# Patient Record
Sex: FEMALE | Race: WHITE | Employment: OTHER | URBAN - METROPOLITAN AREA
[De-identification: names, ages, dates, MRNs, and addresses within clinical notes are randomized per-mention and may not be internally consistent; named-entity substitution may affect disease eponyms.]

---

## 2017-02-09 ENCOUNTER — OFFICE VISIT (OUTPATIENT)
Dept: FAMILY MEDICINE CLINIC | Age: 77
End: 2017-02-09

## 2017-02-09 VITALS
BODY MASS INDEX: 19.99 KG/M2 | HEART RATE: 62 BPM | DIASTOLIC BLOOD PRESSURE: 60 MMHG | SYSTOLIC BLOOD PRESSURE: 138 MMHG | RESPIRATION RATE: 16 BRPM | OXYGEN SATURATION: 99 % | WEIGHT: 120 LBS | HEIGHT: 65 IN

## 2017-02-09 DIAGNOSIS — I48.0 PAROXYSMAL A-FIB (HCC): ICD-10-CM

## 2017-02-09 DIAGNOSIS — E03.9 HYPOTHYROIDISM, UNSPECIFIED TYPE: ICD-10-CM

## 2017-02-09 DIAGNOSIS — I10 ESSENTIAL HYPERTENSION: ICD-10-CM

## 2017-02-09 DIAGNOSIS — I10 ESSENTIAL HYPERTENSION: Primary | ICD-10-CM

## 2017-02-09 LAB
A/G RATIO: 1.9 (ref 1.1–2.2)
ALBUMIN SERPL-MCNC: 4.2 G/DL (ref 3.4–5)
ALP BLD-CCNC: 62 U/L (ref 40–129)
ALT SERPL-CCNC: 15 U/L (ref 10–40)
ANION GAP SERPL CALCULATED.3IONS-SCNC: 12 MMOL/L (ref 3–16)
AST SERPL-CCNC: 18 U/L (ref 15–37)
BASOPHILS ABSOLUTE: 0.1 K/UL (ref 0–0.2)
BASOPHILS RELATIVE PERCENT: 1.5 %
BILIRUB SERPL-MCNC: 0.6 MG/DL (ref 0–1)
BUN BLDV-MCNC: 11 MG/DL (ref 7–20)
CALCIUM SERPL-MCNC: 9.2 MG/DL (ref 8.3–10.6)
CHLORIDE BLD-SCNC: 101 MMOL/L (ref 99–110)
CHOLESTEROL, TOTAL: 215 MG/DL (ref 0–199)
CO2: 26 MMOL/L (ref 21–32)
CREAT SERPL-MCNC: <0.5 MG/DL (ref 0.6–1.2)
EOSINOPHILS ABSOLUTE: 0.2 K/UL (ref 0–0.6)
EOSINOPHILS RELATIVE PERCENT: 5.1 %
GFR AFRICAN AMERICAN: >60
GFR NON-AFRICAN AMERICAN: >60
GLOBULIN: 2.2 G/DL
GLUCOSE BLD-MCNC: 88 MG/DL (ref 70–99)
HCT VFR BLD CALC: 39 % (ref 36–48)
HDLC SERPL-MCNC: 92 MG/DL (ref 40–60)
HEMOGLOBIN: 13.2 G/DL (ref 12–16)
LDL CHOLESTEROL CALCULATED: 105 MG/DL
LYMPHOCYTES ABSOLUTE: 1.4 K/UL (ref 1–5.1)
LYMPHOCYTES RELATIVE PERCENT: 32.9 %
MCH RBC QN AUTO: 33.4 PG (ref 26–34)
MCHC RBC AUTO-ENTMCNC: 33.7 G/DL (ref 31–36)
MCV RBC AUTO: 99.2 FL (ref 80–100)
MONOCYTES ABSOLUTE: 0.8 K/UL (ref 0–1.3)
MONOCYTES RELATIVE PERCENT: 18.5 %
NEUTROPHILS ABSOLUTE: 1.8 K/UL (ref 1.7–7.7)
NEUTROPHILS RELATIVE PERCENT: 42 %
PDW BLD-RTO: 13 % (ref 12.4–15.4)
PLATELET # BLD: 198 K/UL (ref 135–450)
PMV BLD AUTO: 9.2 FL (ref 5–10.5)
POTASSIUM SERPL-SCNC: 4.3 MMOL/L (ref 3.5–5.1)
RBC # BLD: 3.93 M/UL (ref 4–5.2)
SODIUM BLD-SCNC: 139 MMOL/L (ref 136–145)
T4 FREE: 1.5 NG/DL (ref 0.9–1.8)
TOTAL PROTEIN: 6.4 G/DL (ref 6.4–8.2)
TRIGL SERPL-MCNC: 89 MG/DL (ref 0–150)
TSH REFLEX: 4.79 UIU/ML (ref 0.27–4.2)
VLDLC SERPL CALC-MCNC: 18 MG/DL
WBC # BLD: 4.3 K/UL (ref 4–11)

## 2017-02-09 PROCEDURE — 4040F PNEUMOC VAC/ADMIN/RCVD: CPT | Performed by: FAMILY MEDICINE

## 2017-02-09 PROCEDURE — 1123F ACP DISCUSS/DSCN MKR DOCD: CPT | Performed by: FAMILY MEDICINE

## 2017-02-09 PROCEDURE — 1036F TOBACCO NON-USER: CPT | Performed by: FAMILY MEDICINE

## 2017-02-09 PROCEDURE — G8484 FLU IMMUNIZE NO ADMIN: HCPCS | Performed by: FAMILY MEDICINE

## 2017-02-09 PROCEDURE — 99214 OFFICE O/P EST MOD 30 MIN: CPT | Performed by: FAMILY MEDICINE

## 2017-02-09 PROCEDURE — G8427 DOCREV CUR MEDS BY ELIG CLIN: HCPCS | Performed by: FAMILY MEDICINE

## 2017-02-09 PROCEDURE — G8419 CALC BMI OUT NRM PARAM NOF/U: HCPCS | Performed by: FAMILY MEDICINE

## 2017-02-09 PROCEDURE — G8399 PT W/DXA RESULTS DOCUMENT: HCPCS | Performed by: FAMILY MEDICINE

## 2017-02-09 PROCEDURE — 1090F PRES/ABSN URINE INCON ASSESS: CPT | Performed by: FAMILY MEDICINE

## 2017-02-09 RX ORDER — LEVOTHYROXINE SODIUM 0.07 MG/1
75 TABLET ORAL DAILY
Qty: 90 TABLET | Refills: 5 | Status: SHIPPED | OUTPATIENT
Start: 2017-02-09

## 2017-02-09 ASSESSMENT — ENCOUNTER SYMPTOMS
SHORTNESS OF BREATH: 0
ORTHOPNEA: 0
BLURRED VISION: 0

## 2017-04-24 RX ORDER — ESTRADIOL 10 UG/1
10 INSERT VAGINAL
Qty: 24 TABLET | Refills: 3 | Status: SHIPPED | OUTPATIENT
Start: 2017-04-24 | End: 2017-06-05 | Stop reason: SDUPTHER

## 2017-06-05 ENCOUNTER — OFFICE VISIT (OUTPATIENT)
Dept: OBGYN CLINIC | Age: 77
End: 2017-06-05

## 2017-06-05 VITALS
HEIGHT: 64 IN | SYSTOLIC BLOOD PRESSURE: 106 MMHG | BODY MASS INDEX: 20.14 KG/M2 | WEIGHT: 118 LBS | DIASTOLIC BLOOD PRESSURE: 62 MMHG | TEMPERATURE: 98.8 F | HEART RATE: 62 BPM

## 2017-06-05 DIAGNOSIS — N36.2 URETHRAL CARUNCLE: ICD-10-CM

## 2017-06-05 DIAGNOSIS — Z53.8 PAP SMEAR OF CERVIX NOT NEEDED: ICD-10-CM

## 2017-06-05 DIAGNOSIS — N81.11 MIDLINE CYSTOCELE: ICD-10-CM

## 2017-06-05 DIAGNOSIS — Z01.419 WOMEN'S ANNUAL ROUTINE GYNECOLOGICAL EXAMINATION: Primary | ICD-10-CM

## 2017-06-05 DIAGNOSIS — N95.2 VAGINAL ATROPHY: ICD-10-CM

## 2017-06-05 PROCEDURE — 1036F TOBACCO NON-USER: CPT | Performed by: OBSTETRICS & GYNECOLOGY

## 2017-06-05 PROCEDURE — G8427 DOCREV CUR MEDS BY ELIG CLIN: HCPCS | Performed by: OBSTETRICS & GYNECOLOGY

## 2017-06-05 PROCEDURE — G8420 CALC BMI NORM PARAMETERS: HCPCS | Performed by: OBSTETRICS & GYNECOLOGY

## 2017-06-05 PROCEDURE — G0101 CA SCREEN;PELVIC/BREAST EXAM: HCPCS | Performed by: OBSTETRICS & GYNECOLOGY

## 2017-06-05 RX ORDER — ESTRADIOL 10 UG/1
10 INSERT VAGINAL
Qty: 24 TABLET | Refills: 3 | Status: SHIPPED | OUTPATIENT
Start: 2017-06-05 | End: 2018-04-02 | Stop reason: ALTCHOICE

## 2017-06-11 PROBLEM — Z53.8 PAP SMEAR OF CERVIX NOT NEEDED: Status: ACTIVE | Noted: 2017-06-11

## 2017-06-11 ASSESSMENT — ENCOUNTER SYMPTOMS
NAUSEA: 0
DIARRHEA: 0
ABDOMINAL PAIN: 0
SHORTNESS OF BREATH: 0
CONSTIPATION: 0
VOMITING: 0
ABDOMINAL DISTENTION: 0

## 2017-09-01 ENCOUNTER — OFFICE VISIT (OUTPATIENT)
Dept: CARDIOLOGY CLINIC | Age: 77
End: 2017-09-01

## 2017-09-01 VITALS
HEART RATE: 59 BPM | HEIGHT: 64 IN | OXYGEN SATURATION: 98 % | SYSTOLIC BLOOD PRESSURE: 139 MMHG | BODY MASS INDEX: 20.4 KG/M2 | DIASTOLIC BLOOD PRESSURE: 73 MMHG | WEIGHT: 119.5 LBS

## 2017-09-01 DIAGNOSIS — I48.91 ATRIAL FIBRILLATION WITH RVR (HCC): Primary | ICD-10-CM

## 2017-09-01 DIAGNOSIS — I48.91 ATRIAL FIBRILLATION, UNSPECIFIED TYPE (HCC): ICD-10-CM

## 2017-09-01 PROCEDURE — 4040F PNEUMOC VAC/ADMIN/RCVD: CPT | Performed by: INTERNAL MEDICINE

## 2017-09-01 PROCEDURE — G8420 CALC BMI NORM PARAMETERS: HCPCS | Performed by: INTERNAL MEDICINE

## 2017-09-01 PROCEDURE — 93000 ELECTROCARDIOGRAM COMPLETE: CPT | Performed by: INTERNAL MEDICINE

## 2017-09-01 PROCEDURE — 99214 OFFICE O/P EST MOD 30 MIN: CPT | Performed by: INTERNAL MEDICINE

## 2017-09-01 PROCEDURE — 1036F TOBACCO NON-USER: CPT | Performed by: INTERNAL MEDICINE

## 2017-09-01 PROCEDURE — G8427 DOCREV CUR MEDS BY ELIG CLIN: HCPCS | Performed by: INTERNAL MEDICINE

## 2017-09-01 PROCEDURE — 1090F PRES/ABSN URINE INCON ASSESS: CPT | Performed by: INTERNAL MEDICINE

## 2017-09-01 PROCEDURE — 1123F ACP DISCUSS/DSCN MKR DOCD: CPT | Performed by: INTERNAL MEDICINE

## 2017-09-01 PROCEDURE — G8399 PT W/DXA RESULTS DOCUMENT: HCPCS | Performed by: INTERNAL MEDICINE

## 2017-09-01 RX ORDER — DABIGATRAN ETEXILATE 150 MG/1
150 CAPSULE, COATED PELLETS ORAL PRN
Qty: 10 CAPSULE | Refills: 0 | Status: SHIPPED | OUTPATIENT
Start: 2017-09-01 | End: 2018-10-08 | Stop reason: SDUPTHER

## 2017-09-06 RX ORDER — PROPAFENONE HYDROCHLORIDE 300 MG/1
300 TABLET, COATED ORAL DAILY PRN
Qty: 90 TABLET | Refills: 3 | Status: SHIPPED | OUTPATIENT
Start: 2017-09-06 | End: 2018-10-08 | Stop reason: SDUPTHER

## 2018-04-02 ENCOUNTER — TELEPHONE (OUTPATIENT)
Dept: OBGYN CLINIC | Age: 78
End: 2018-04-02

## 2018-04-02 RX ORDER — ESTRADIOL 0.1 MG/G
1 CREAM VAGINAL
Qty: 1 TUBE | Refills: 1 | Status: SHIPPED | OUTPATIENT
Start: 2018-04-02 | End: 2018-10-01 | Stop reason: SDUPTHER

## 2018-06-11 PROBLEM — N81.11 PROLAPSE OF VAGINAL WALL WITH MIDLINE CYSTOCELE: Status: ACTIVE | Noted: 2017-06-11

## 2018-06-11 PROBLEM — N63.20 BREAST MASS, LEFT: Status: ACTIVE | Noted: 2018-06-11

## 2018-06-22 ENCOUNTER — HOSPITAL ENCOUNTER (OUTPATIENT)
Dept: MAMMOGRAPHY | Age: 78
Discharge: HOME | End: 2018-06-22
Attending: FAMILY MEDICINE | Admitting: FAMILY MEDICINE

## 2018-06-22 DIAGNOSIS — Z12.31 ENCOUNTER FOR SCREENING MAMMOGRAM FOR BREAST CANCER: ICD-10-CM

## 2018-07-05 ENCOUNTER — TELEPHONE (OUTPATIENT)
Dept: ORTHOPEDIC SURGERY | Age: 78
End: 2018-07-05

## 2018-07-06 ENCOUNTER — TELEPHONE (OUTPATIENT)
Dept: ORTHOPEDIC SURGERY | Age: 78
End: 2018-07-06

## 2018-07-17 ENCOUNTER — TELEPHONE (OUTPATIENT)
Dept: ORTHOPEDIC SURGERY | Age: 78
End: 2018-07-17

## 2018-07-31 ENCOUNTER — OFFICE VISIT (OUTPATIENT)
Dept: ORTHOPEDIC SURGERY | Age: 78
End: 2018-07-31

## 2018-07-31 ENCOUNTER — PRE-EVALUATION (OUTPATIENT)
Dept: ORTHOPEDIC SURGERY | Age: 78
End: 2018-07-31

## 2018-07-31 VITALS
DIASTOLIC BLOOD PRESSURE: 70 MMHG | HEART RATE: 68 BPM | WEIGHT: 119 LBS | SYSTOLIC BLOOD PRESSURE: 118 MMHG | HEIGHT: 64 IN | BODY MASS INDEX: 20.32 KG/M2

## 2018-07-31 DIAGNOSIS — M41.9 SCOLIOSIS OF THORACOLUMBAR SPINE, UNSPECIFIED SCOLIOSIS TYPE: ICD-10-CM

## 2018-07-31 DIAGNOSIS — M43.17 SPONDYLOLISTHESIS AT L5-S1 LEVEL: Primary | ICD-10-CM

## 2018-07-31 PROCEDURE — G8420 CALC BMI NORM PARAMETERS: HCPCS | Performed by: ORTHOPAEDIC SURGERY

## 2018-07-31 PROCEDURE — 1090F PRES/ABSN URINE INCON ASSESS: CPT | Performed by: ORTHOPAEDIC SURGERY

## 2018-07-31 PROCEDURE — 1101F PT FALLS ASSESS-DOCD LE1/YR: CPT | Performed by: ORTHOPAEDIC SURGERY

## 2018-07-31 PROCEDURE — 99203 OFFICE O/P NEW LOW 30 MIN: CPT | Performed by: ORTHOPAEDIC SURGERY

## 2018-07-31 PROCEDURE — APPNB30 APP NON BILLABLE TIME 0-30 MINS: Performed by: PHYSICIAN ASSISTANT

## 2018-07-31 PROCEDURE — G8427 DOCREV CUR MEDS BY ELIG CLIN: HCPCS | Performed by: ORTHOPAEDIC SURGERY

## 2018-07-31 NOTE — PROGRESS NOTES
History of present illness:   Ms. 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 Sujit Riley for consultation regarding her LBP and right leg pain. She notes chronic back pain, but states her right leg pain began insidiously about 2 months ago. Her pain has steadily increased since then. She rates her back pain 7/10 and leg pain 8/10. The leg pain radiates down the lateral aspect of her leg from her knee to her foot. Her pain is most severe in her knee. She notes numbness and tingling in her right leg. [place] weakness of her right knee. She denies saddle numbness or bowel or bladder dysfunction. Her pain is worst with standing and walking, relieved completely with sitting. She states she can sit as long as she likes and stand for a maximum of 10 minutes. The pain moderately disrupts her sleep. She has tried physical therapy and epidural injections in the distant past. She notes she had 4 episodes of shingles following her 2nd injection. Past medical history:  Her past medical history has been reviewed and is significant for atrial fibrillation, HTN, scoliosis, and macular degeneration. Past surgical history:  Her past surgical history has been reviewed and is non-contributory to her present illness. Her medications and allergies were reviewed. Social history:  Her social history has been reviewed and she never smoked      Review of symptoms:  Patient's review of symptoms was reviewed and is significant for difficulty sleeping and night sweats and negative for recent weight loss, fatigue, chills, visual disturbances, blood in stool or urine, recent infection, chest pain, or shortness of breath. Physical examination:  Ms. Clark Salvage most recent vitals: Body mass index is 20.43 kg/m².     She is well-developed and well-nourished, is in obvious pain and alert and oriented to

## 2018-09-13 ENCOUNTER — TELEPHONE (OUTPATIENT)
Dept: CARDIOLOGY CLINIC | Age: 78
End: 2018-09-13

## 2018-09-13 NOTE — TELEPHONE ENCOUNTER
Pt called to make her yearly followup with YEHUDA. I informed the pt that YEHUDA is booked for the rest of the year at the [facility] office and offered [facility] location. Pt proceeded to ask me if she can switch to . She stated it has been an issue with getting her messages, any appointments with  and does not want to follow with him any longer. Can pt see ? I informed the pt that we will return her call.

## 2018-09-18 NOTE — TELEPHONE ENCOUNTER
9-18-18  at [phone] informing pt that she can see RPS or NPBB, ok'd per RPS, for her yearly followup.

## 2018-10-01 RX ORDER — ESTRADIOL 0.1 MG/G
CREAM VAGINAL
Qty: 1 TUBE | Refills: 0 | Status: SHIPPED | OUTPATIENT
Start: 2018-10-01 | End: 2018-12-26 | Stop reason: SDUPTHER

## 2018-10-08 ENCOUNTER — OFFICE VISIT (OUTPATIENT)
Dept: CARDIOLOGY CLINIC | Age: 78
End: 2018-10-08
Payer: MEDICARE

## 2018-10-08 VITALS — OXYGEN SATURATION: 98 % | DIASTOLIC BLOOD PRESSURE: 74 MMHG | SYSTOLIC BLOOD PRESSURE: 122 MMHG | HEART RATE: 57 BPM

## 2018-10-08 DIAGNOSIS — I48.91 ATRIAL FIBRILLATION WITH RVR (HCC): Primary | ICD-10-CM

## 2018-10-08 PROCEDURE — 1036F TOBACCO NON-USER: CPT | Performed by: INTERNAL MEDICINE

## 2018-10-08 PROCEDURE — G8420 CALC BMI NORM PARAMETERS: HCPCS | Performed by: INTERNAL MEDICINE

## 2018-10-08 PROCEDURE — 4040F PNEUMOC VAC/ADMIN/RCVD: CPT | Performed by: INTERNAL MEDICINE

## 2018-10-08 PROCEDURE — 93000 ELECTROCARDIOGRAM COMPLETE: CPT | Performed by: INTERNAL MEDICINE

## 2018-10-08 PROCEDURE — G8484 FLU IMMUNIZE NO ADMIN: HCPCS | Performed by: INTERNAL MEDICINE

## 2018-10-08 PROCEDURE — 1123F ACP DISCUSS/DSCN MKR DOCD: CPT | Performed by: INTERNAL MEDICINE

## 2018-10-08 PROCEDURE — G8427 DOCREV CUR MEDS BY ELIG CLIN: HCPCS | Performed by: INTERNAL MEDICINE

## 2018-10-08 PROCEDURE — G8399 PT W/DXA RESULTS DOCUMENT: HCPCS | Performed by: INTERNAL MEDICINE

## 2018-10-08 PROCEDURE — 1101F PT FALLS ASSESS-DOCD LE1/YR: CPT | Performed by: INTERNAL MEDICINE

## 2018-10-08 PROCEDURE — 1090F PRES/ABSN URINE INCON ASSESS: CPT | Performed by: INTERNAL MEDICINE

## 2018-10-08 PROCEDURE — 99214 OFFICE O/P EST MOD 30 MIN: CPT | Performed by: INTERNAL MEDICINE

## 2018-10-08 RX ORDER — PROPAFENONE HYDROCHLORIDE 225 MG/1
225 TABLET, FILM COATED ORAL DAILY PRN
Qty: 30 TABLET | Refills: 11 | Status: SHIPPED | OUTPATIENT
Start: 2018-10-08 | End: 2019-11-06 | Stop reason: SDUPTHER

## 2018-10-08 RX ORDER — DABIGATRAN ETEXILATE 150 MG/1
150 CAPSULE, COATED PELLETS ORAL 2 TIMES DAILY
Qty: 60 CAPSULE | Refills: 11 | Status: SHIPPED | OUTPATIENT
Start: 2018-10-08 | End: 2018-10-15 | Stop reason: ALTCHOICE

## 2018-10-08 NOTE — PROGRESS NOTES
[address]   Electrophysiology Note      Reason for office visit: New Patient (switching from [facility]) and 1 Year Follow Up  Primary care physician: Jesus Manuel Vila MD     HISTORY OF PRESENT ILLNESS: Wendie Villa is a [de-identified] y.o. female who presents for follow up for paroxysmal atrial fibrillation. She was previously followed by Dr. Eliud Mckay for symptomatic PAF. She has been using the \"pill in the pocket\" approach with as needed Rythmol and Pradaxa. She had an episode of afib October 2017. She took Rythmol 300 mg. Her heart rate dropped from 121-38. From the time of October 2017-August 2018, she had 6 episodes of afib. Her EKG from today shows sinus martha rate 55. She feels that her as needed medications are not as effective as they were previously. She is wondering if her Rythmol should be increased. She states that her episodes of afib are lasting longer (2-3 hours), when they previously lasted roughly 50 minutes after taking her medications. Patient currently denies any weight gain, edema, palpitations, chest pain, shortness of breath, dizziness, and syncope. Past Medical History:   has a past medical history of Atrial fibrillation ([address] Blood circulation, collateral; Hypertension; Hypothyroidism; Lumbar scoliosis; Macular degeneration; Sciatica; and Slipped disc. Past Surgical History:   has a past surgical history that includes Cataract removal (Bilateral); Knee arthroscopy; [place] tooth extraction; and Tonsillectomy. Social History:   reports that she has never smoked. She has never used smokeless tobacco. She reports that she drinks about 0.6 oz of alcohol per week . She reports that she does not use drugs. Family History: family history includes Arthritis in her maternal grandfather. Allergies:  Nsaids [nsaids]; Hctz; Polysporin [bacitracin-polymyxin b]; and Tolmetin    Home Medications:    Prior to Admission medications    Medication Sig Start Date End Date Taking?  Authorizing present  Extremities:  ·  No Cyanosis or Clubbing  ·  Lower extremity edema: No  · Skin: Warm and dry  Neurological:  · Alert and oriented. · Moves all extremities well  · No abnormalities of mood, affect, memory, mentation, or behavior are noted      DATA:    ECG: Sinus martha 55  ECHO: 1/5/2013  CONCLUSION-     Echocardiogram with a Doppler shows normal systolic function of left  ventricle with ejection fraction of 55%. No significant valvular  heart disease is seen. IMPRESSION:     Paroxysmal atrial fibrillation      [id number] Score for Atrial Fibrillation Stroke Risk   Risk   Factors  Component Value   C CHF No 0   H HTN Yes 1   A2 Age >= 76 Yes,  [de-identified] y.o.) 2   D DM No 0   S2 Prior Stroke/TIA No 0   V Vascular Disease No 0   A Age 74-69 No,  [de-identified] y.o.) 0   Sc Sex female 1    [id number]  Score  4   Score last updated 60/1/23 3:38 PM    Click here for a link to the UpToDate guideline \"Atrial Fibrillation: Anticoagulation therapy to prevent embolization    Disclaimer: Risk Score calculation is dependent on accuracy of patient problem list and past encounter diagnosis. RECOMMENDATIONS:  1. Increase Rythmol to 225 mg as needed for episodes of afib. 2. Discussed the effects of these medications on your heart. 3. Discussed the risk of stroke with Paroxysmal atrial fibrillation and persistent afib and that the risk for stroke is equivalent. 4. Recommend long term anticoagulation. Pradaxa 150 mg twice daily. 5. Check Eliquis 5 mg twice daily or Xarelto 20 mg daily. 6. Follow up with Foy Osler CNP in 1 year. QUALITY MEASURES  1. Tobacco Cessation Counseling: NA  2. Retake of BP if >140/90:   NA  3. Documentation to PCP/referring for new patient:  Sent to PCP at close of office visit  4. CAD patient on anti-platelet: NA  5. CAD patient on STATIN therapy:  NA  6.  Patient with CHF and aFib on anticoagulation:  Yes, as needed Pradaxa     This note was scribed in the presence of Dr. Taylor Car Uriel by Kym Hogan RN. The scribes docuementation has been prepared under my direction and personally reviewed by me in its entirety. I confirm that the note above accurately reflects all work, treatment, procedures, and medical decision making performed by me. All questions and concerns were addressed to the patient/family. Alternatives to my treatment were discussed. NAEEM Trevino F.A.C.C. [place] [address]. [address] [place] [address]. [address].   [place], [address]  Phone: [phone]  Fax: [phone]   10/8/2018

## 2018-10-09 ENCOUNTER — TELEPHONE (OUTPATIENT)
Dept: CARDIOLOGY CLINIC | Age: 78
End: 2018-10-09

## 2018-12-26 RX ORDER — ESTRADIOL 0.1 MG/G
CREAM VAGINAL
Qty: 1 TUBE | Refills: 3 | Status: SHIPPED | OUTPATIENT
Start: 2018-12-26 | End: 2019-09-18 | Stop reason: SDUPTHER

## 2019-09-17 ENCOUNTER — OFFICE VISIT (OUTPATIENT)
Dept: OBGYN CLINIC | Age: 79
End: 2019-09-17
Payer: MEDICARE

## 2019-09-17 VITALS
WEIGHT: 122.2 LBS | BODY MASS INDEX: 20.98 KG/M2 | HEART RATE: 70 BPM | DIASTOLIC BLOOD PRESSURE: 60 MMHG | TEMPERATURE: 97.9 F | SYSTOLIC BLOOD PRESSURE: 124 MMHG

## 2019-09-17 DIAGNOSIS — N81.11 PROLAPSE OF VAGINAL WALL WITH MIDLINE CYSTOCELE: ICD-10-CM

## 2019-09-17 DIAGNOSIS — N36.2 URETHRAL CARUNCLE: ICD-10-CM

## 2019-09-17 DIAGNOSIS — E03.9 HYPOTHYROIDISM, UNSPECIFIED TYPE: ICD-10-CM

## 2019-09-17 DIAGNOSIS — Z01.419 WOMEN'S ANNUAL ROUTINE GYNECOLOGICAL EXAMINATION: Primary | ICD-10-CM

## 2019-09-17 DIAGNOSIS — N95.2 VAGINAL ATROPHY: ICD-10-CM

## 2019-09-17 DIAGNOSIS — I10 ESSENTIAL HYPERTENSION: ICD-10-CM

## 2019-09-17 DIAGNOSIS — Z53.8 PAP SMEAR OF CERVIX NOT NEEDED: ICD-10-CM

## 2019-09-17 PROCEDURE — G0101 CA SCREEN;PELVIC/BREAST EXAM: HCPCS | Performed by: OBSTETRICS & GYNECOLOGY

## 2019-09-17 PROCEDURE — G8427 DOCREV CUR MEDS BY ELIG CLIN: HCPCS | Performed by: OBSTETRICS & GYNECOLOGY

## 2019-09-17 PROCEDURE — G8420 CALC BMI NORM PARAMETERS: HCPCS | Performed by: OBSTETRICS & GYNECOLOGY

## 2019-09-18 RX ORDER — ESTRADIOL 0.1 MG/G
CREAM VAGINAL
Qty: 1 TUBE | Refills: 3 | Status: SHIPPED | OUTPATIENT
Start: 2019-09-18 | End: 2020-09-01

## 2019-09-23 RX ORDER — RIVAROXABAN 20 MG/1
TABLET, FILM COATED ORAL
Qty: 90 TABLET | Refills: 0 | Status: SHIPPED | OUTPATIENT
Start: 2019-09-23 | End: 2019-12-18

## 2019-09-24 NOTE — TELEPHONE ENCOUNTER
9-24-19  at [phone] informing pt to contact office to schedule annual ov with NPREBEKA and informed her medication was refilled to her [facility] pharmacy.

## 2019-09-29 ASSESSMENT — ENCOUNTER SYMPTOMS
DIARRHEA: 0
ABDOMINAL PAIN: 0
VOMITING: 0
ABDOMINAL DISTENTION: 0
CONSTIPATION: 0
NAUSEA: 0
SHORTNESS OF BREATH: 0

## 2019-11-06 ENCOUNTER — OFFICE VISIT (OUTPATIENT)
Dept: CARDIOLOGY CLINIC | Age: 79
End: 2019-11-06
Payer: MEDICARE

## 2019-11-06 VITALS
WEIGHT: 123.4 LBS | BODY MASS INDEX: 21.07 KG/M2 | DIASTOLIC BLOOD PRESSURE: 64 MMHG | HEIGHT: 64 IN | OXYGEN SATURATION: 99 % | HEART RATE: 58 BPM | SYSTOLIC BLOOD PRESSURE: 122 MMHG

## 2019-11-06 DIAGNOSIS — I10 ESSENTIAL HYPERTENSION: ICD-10-CM

## 2019-11-06 DIAGNOSIS — I48.0 PAROXYSMAL ATRIAL FIBRILLATION (HCC): Primary | ICD-10-CM

## 2019-11-06 PROCEDURE — 4040F PNEUMOC VAC/ADMIN/RCVD: CPT | Performed by: NURSE PRACTITIONER

## 2019-11-06 PROCEDURE — G8399 PT W/DXA RESULTS DOCUMENT: HCPCS | Performed by: NURSE PRACTITIONER

## 2019-11-06 PROCEDURE — 1036F TOBACCO NON-USER: CPT | Performed by: NURSE PRACTITIONER

## 2019-11-06 PROCEDURE — G8420 CALC BMI NORM PARAMETERS: HCPCS | Performed by: NURSE PRACTITIONER

## 2019-11-06 PROCEDURE — G8427 DOCREV CUR MEDS BY ELIG CLIN: HCPCS | Performed by: NURSE PRACTITIONER

## 2019-11-06 PROCEDURE — 1090F PRES/ABSN URINE INCON ASSESS: CPT | Performed by: NURSE PRACTITIONER

## 2019-11-06 PROCEDURE — 93000 ELECTROCARDIOGRAM COMPLETE: CPT | Performed by: NURSE PRACTITIONER

## 2019-11-06 PROCEDURE — 99213 OFFICE O/P EST LOW 20 MIN: CPT | Performed by: NURSE PRACTITIONER

## 2019-11-06 PROCEDURE — 1123F ACP DISCUSS/DSCN MKR DOCD: CPT | Performed by: NURSE PRACTITIONER

## 2019-11-06 PROCEDURE — G8484 FLU IMMUNIZE NO ADMIN: HCPCS | Performed by: NURSE PRACTITIONER

## 2019-11-06 RX ORDER — PROPAFENONE HYDROCHLORIDE 225 MG/1
225 TABLET, FILM COATED ORAL DAILY PRN
Qty: 30 TABLET | Refills: 1 | Status: SHIPPED | OUTPATIENT
Start: 2019-11-06 | End: 2020-11-12 | Stop reason: ALTCHOICE

## 2019-12-18 RX ORDER — RIVAROXABAN 20 MG/1
TABLET, FILM COATED ORAL
Qty: 90 TABLET | Refills: 0 | Status: SHIPPED | OUTPATIENT
Start: 2019-12-18 | End: 2020-03-12

## 2020-03-12 ENCOUNTER — TELEPHONE (OUTPATIENT)
Dept: CARDIOLOGY CLINIC | Age: 80
End: 2020-03-12

## 2020-03-12 RX ORDER — RIVAROXABAN 20 MG/1
TABLET, FILM COATED ORAL
Qty: 90 TABLET | Refills: 0 | Status: SHIPPED | OUTPATIENT
Start: 2020-03-12 | End: 2020-06-08 | Stop reason: SDUPTHER

## 2020-06-05 ENCOUNTER — TELEPHONE (OUTPATIENT)
Dept: CARDIOLOGY CLINIC | Age: 80
End: 2020-06-05

## 2020-06-08 NOTE — TELEPHONE ENCOUNTER
LV 11/6/2019  Assessment:   1. Symptomatic paroxysmal atrial fibrillation: stable              -patient has been on Rythmol with a pill in the pocket approach since at least 2013              -[id number] score 4 (age, gender, HTN)  2. HTN: controlled   3. Hypothyroidism: on Synthroid     Plan:   1. Continue verapamil, metoprolol, and Xarelto  2. Continue Rythmol 225mg po x1 PRN sustained palpitations  3. Annual BMP and CBC (due 2/2020)  4.  Follow up in one year     CBC and BMP done 23/12/2020 care everywhere

## 2020-09-01 RX ORDER — ESTRADIOL 0.1 MG/G
CREAM VAGINAL
Qty: 1 TUBE | Refills: 2 | Status: SHIPPED | OUTPATIENT
Start: 2020-09-01 | End: 2021-05-17

## 2020-11-12 ENCOUNTER — OFFICE VISIT (OUTPATIENT)
Dept: CARDIOLOGY CLINIC | Age: 80
End: 2020-11-12
Payer: MEDICARE

## 2020-11-12 VITALS
DIASTOLIC BLOOD PRESSURE: 60 MMHG | BODY MASS INDEX: 20.57 KG/M2 | SYSTOLIC BLOOD PRESSURE: 110 MMHG | HEART RATE: 46 BPM | OXYGEN SATURATION: 99 % | HEIGHT: 64 IN | WEIGHT: 120.5 LBS

## 2020-11-12 PROBLEM — R00.1 SINUS BRADYCARDIA: Status: ACTIVE | Noted: 2020-11-12

## 2020-11-12 PROCEDURE — 1090F PRES/ABSN URINE INCON ASSESS: CPT | Performed by: NURSE PRACTITIONER

## 2020-11-12 PROCEDURE — G8484 FLU IMMUNIZE NO ADMIN: HCPCS | Performed by: NURSE PRACTITIONER

## 2020-11-12 PROCEDURE — 1123F ACP DISCUSS/DSCN MKR DOCD: CPT | Performed by: NURSE PRACTITIONER

## 2020-11-12 PROCEDURE — G8399 PT W/DXA RESULTS DOCUMENT: HCPCS | Performed by: NURSE PRACTITIONER

## 2020-11-12 PROCEDURE — 4040F PNEUMOC VAC/ADMIN/RCVD: CPT | Performed by: NURSE PRACTITIONER

## 2020-11-12 PROCEDURE — G8420 CALC BMI NORM PARAMETERS: HCPCS | Performed by: NURSE PRACTITIONER

## 2020-11-12 PROCEDURE — 1036F TOBACCO NON-USER: CPT | Performed by: NURSE PRACTITIONER

## 2020-11-12 PROCEDURE — 93000 ELECTROCARDIOGRAM COMPLETE: CPT | Performed by: NURSE PRACTITIONER

## 2020-11-12 PROCEDURE — G8427 DOCREV CUR MEDS BY ELIG CLIN: HCPCS | Performed by: NURSE PRACTITIONER

## 2020-11-12 PROCEDURE — 99214 OFFICE O/P EST MOD 30 MIN: CPT | Performed by: NURSE PRACTITIONER

## 2020-11-12 RX ORDER — PROPAFENONE HYDROCHLORIDE 150 MG/1
150 TABLET, FILM COATED ORAL DAILY PRN
Qty: 30 TABLET | Refills: 0 | Status: SHIPPED | OUTPATIENT
Start: 2020-11-12 | End: 2020-12-04

## 2020-11-12 NOTE — PROGRESS NOTES
[address]   Electrophysiology Note              Date:  November 12, 2020  Patient name: Archana Sales  YOB: 1940    Primary Care physician: Mili Arguello MD    HISTORY OF PRESENT ILLNESS: The patient is an 78 y.o.  female with a history of PAF, sinus bradycardia, HTN, and hypothyroidism. She was admitted in 1/2013 with symptomatic afib and converted with Rythmol 600mg po X1. Echo in 2013 showed an EF of 55%. She has been maintained on a pill in the pocket approach with Rythmol. Today she is being seen for afib. EKG shows likely ectopic atrial rhythm with a HR of 45. She has taken Rythmol 4 times in the past year (last episode was last night). States she converts to normal rhythm within an hour. Patient prefers taking medications (verapamil and metoprolol) TID with food. Monitors HR at home and average is 60. Feels HR is always lower after taking Rythmol if taken in combination with metoprolol and verapamil. Home BP is averaging 130/70. She currently denies chest pain, shortness of breath, palpitations, and dizziness. Is going up and down stairs for exercise. Past Medical History:   has a past medical history of Atrial fibrillation ([address] Blood circulation, collateral, Hypertension, Hypothyroidism, Lumbar scoliosis, Macular degeneration, Sciatica, and Slipped disc. Past Surgical History:   has a past surgical history that includes Cataract removal (Bilateral); Knee arthroscopy; [place] tooth extraction; and Tonsillectomy. Home Medications:    Prior to Admission medications    Medication Sig Start Date End Date Taking?  Authorizing Provider   estradiol (ESTRACE) 0.1 MG/GM vaginal cream INSERT ONE GRAM VAGINALLY TWICE A WEEK 9/1/20  Yes Flex Quijano DO   rivaroxaban (XARELTO) 20 MG TABS tablet Take 1 tablet by mouth Daily with supper 6/8/20  Yes DANIELA Hunter CNP   propafenone (RYTHMOL) 225 MG tablet Take 1 tablet by mouth daily as needed (One Tablet Daily As Needed) Take 1 tablets by mouth as needed. 11/6/19  Yes DANIELA Burgos - CNP   verapamil (CALAN) 80 MG tablet Take 80 mg by mouth 3 times daily   Yes Historical Provider, MD   levothyroxine (SYNTHROID) 75 MCG tablet Take 1 tablet by mouth daily 2/9/17  Yes Barrie Yu MD   metoprolol (LOPRESSOR) 100 MG tablet Take 1.5 tablets by mouth 2 times daily  Patient taking differently: Take 50 mg by mouth three times daily  7/18/16  Yes Barrie Yu MD       Allergies:  Nsaids [nsaids]; Hctz; Polysporin [bacitracin-polymyxin b]; and Tolmetin    Social History:   reports that she has never smoked. She has never used smokeless tobacco. She reports current alcohol use of about 1.0 standard drinks of alcohol per week. She reports that she does not use drugs. Family History: family history includes Arthritis in her maternal grandfather. Review of Systems   Constitutional: Negative. HENT: Negative. Eyes: Negative. Respiratory: Negative. Cardiovascular: see HPI  Gastrointestinal: Negative. Genitourinary: Negative. Musculoskeletal: Negative. Skin: Negative. Neurological: Negative. Hematological: Negative. Psychiatric/Behavioral: Negative. PHYSICAL EXAM:    Vital signs:    /60   Pulse (!) 46   Ht 5' 4\" (1.626 m)   Wt 120 lb 8 oz (54.7 kg)   SpO2 99%   BMI 20.68 kg/m²      Constitutional and general appearance: alert, cooperative, no distress and appears stated age  HEENT: PERRL, no cervical lymphadenopathy. No masses palpable.  Normal oral mucosa  Respiratory:  · Normal excursion and expansion without use of accessory muscles  · Resp auscultation: Normal breath sounds without wheezing, rhonchi, and rales  Cardiovascular:  · The apical impulse is not displaced  · Heart tones are crisp and normal. regular S1 and S2.  · Jugular venous pulsation Normal  · The carotid upstroke is normal in amplitude and contour without delay or bruit  · Peripheral pulses are symmetrical and full   Abdomen:  · No masses or tenderness  · Bowel sounds present  Extremities:  ·  No cyanosis or clubbing  ·  Trace lower extremity edema  ·  Skin: warm and dry  Neurological:  · Alert and oriented  · Moves all extremities well  · No abnormalities of mood, affect, memory, mentation, or behavior are noted    DATA:    ECG 11/12/2020:  Likely ectopic atrial rhythm, HR 45    Echo 1/5/2013:  Echocardiogram with a Doppler shows normal systolic function of left ventricle with ejection fraction of 55%. No significant valvular heart disease is seen. All labs and testing reviewed. CARDIOLOGY LABS:   CBC: No results for input(s): WBC, HGB, HCT, PLT in the last 72 hours. BMP: No results for input(s): NA, K, CO2, BUN, CREATININE, LABGLOM, GLUCOSE in the last 72 hours. PT/INR: No results for input(s): PROTIME, INR in the last 72 hours. APTT:No results for input(s): APTT in the last 72 hours. FASTING LIPID PANEL:  Lab Results   Component Value Date    HDL 92 02/09/2017    HDL 74 10/07/2010    LDLCALC 105 02/09/2017    TRIG 89 02/09/2017     LIVER PROFILE:No results for input(s): AST, ALT, ALB in the last 72 hours. Assessment:   1. Symptomatic paroxysmal atrial fibrillation: stable   -patient has been on Rythmol with a pill in the pocket approach since at least 2013   -[id number] score 4 (age, gender, HTN)  2. Bradycardia: stable, asymptomatic  3. HTN: controlled   4. Hypothyroidism: on Synthroid    Plan:   1. Continue verapamil, metoprolol, and Xarelto  2. Change Rythmol to short acting 150mg po x1 PRN sustained palpitations  3. Annual BMP and CBC (due 3/2021)   4. Monitor HR at home and call if consistently less than 50  5. Follow up in 4 weeks     Discussed decreasing either verapamil or metoprolol. Patient is asymptomatic, feeling well, and exercising. Is vigilant with monitoring HR at home. Feels very stable and feels BP is now controlled. She agreed to above plan and will call for any changes. Creatinine clearance with age 78, weight 120 lbs, and creat 0.7 is 53mL/min.     DANIELA Antonio-CNP  [facility]  [phone]

## 2020-11-12 NOTE — PATIENT INSTRUCTIONS
Decrease Rythmol to short acting 150mg once as needed  Call me if heart rate is staying less than 50 next week  Follow up in 4 weeks

## 2020-11-12 NOTE — LETTER
[facility], [place]   Electrophysiology Note              Date:  November 12, 2020  Patient name: Ginger Fagan  YOB: 1940    Primary Care physician: Breana Rendon MD    HISTORY OF PRESENT ILLNESS: The patient is an 78 y.o.  female with a history of PAF, sinus bradycardia, HTN, and hypothyroidism. She was admitted in 1/2013 with symptomatic afib and converted with Rythmol 600mg po X1. Echo in 2013 showed an EF of 55%. She has been maintained on a pill in the pocket approach with Rythmol. Today she is being seen for afib. EKG shows likely ectopic atrial rhythm with a HR of 45. She has taken Rythmol 4 times in the past year (last episode was last night). States she converts to normal rhythm within an hour. Patient prefers taking medications (verapamil and metoprolol) TID with food. Monitors HR at home and average is 60. Feels HR is always lower after taking Rythmol if taken in combination with metoprolol and verapamil. Home BP is averaging 130/70. She currently denies chest pain, shortness of breath, palpitations, and dizziness. Is going up and down stairs for exercise. Past Medical History:   has a past medical history of Atrial fibrillation ([address] Blood circulation, collateral, Hypertension, Hypothyroidism, Lumbar scoliosis, Macular degeneration, Sciatica, and Slipped disc. Past Surgical History:   has a past surgical history that includes Cataract removal (Bilateral); Knee arthroscopy; [place] tooth extraction; and Tonsillectomy. Home Medications:    Prior to Admission medications    Medication Sig Start Date End Date Taking?  Authorizing Provider   estradiol (ESTRACE) 0.1 MG/GM vaginal cream INSERT ONE GRAM VAGINALLY TWICE A WEEK 9/1/20  Yes Candance Easter Federer, DO   rivaroxaban (XARELTO) 20 MG TABS tablet Take 1 tablet by mouth Daily with supper 6/8/20  Yes DANIELA Venegas CNP   propafenone (RYTHMOL) 225 MG tablet Take 1 tablet by mouth daily as needed · Peripheral pulses are symmetrical and full   Abdomen:  · No masses or tenderness  · Bowel sounds present  Extremities:  ·  No cyanosis or clubbing  ·  Trace lower extremity edema  ·  Skin: warm and dry  Neurological:  · Alert and oriented  · Moves all extremities well  · No abnormalities of mood, affect, memory, mentation, or behavior are noted    DATA:    ECG 11/12/2020:  Likely ectopic atrial rhythm, HR 45    Echo 1/5/2013:  Echocardiogram with a Doppler shows normal systolic function of left ventricle with ejection fraction of 55%. No significant valvular heart disease is seen. All labs and testing reviewed. CARDIOLOGY LABS:   CBC: No results for input(s): WBC, HGB, HCT, PLT in the last 72 hours. BMP: No results for input(s): NA, K, CO2, BUN, CREATININE, LABGLOM, GLUCOSE in the last 72 hours. PT/INR: No results for input(s): PROTIME, INR in the last 72 hours. APTT:No results for input(s): APTT in the last 72 hours. FASTING LIPID PANEL:  Lab Results   Component Value Date    HDL 92 02/09/2017    HDL 74 10/07/2010    LDLCALC 105 02/09/2017    TRIG 89 02/09/2017     LIVER PROFILE:No results for input(s): AST, ALT, ALB in the last 72 hours. Assessment:   1. Symptomatic paroxysmal atrial fibrillation: stable   -patient has been on Rythmol with a pill in the pocket approach since at least 2013   -[id number] score 4 (age, gender, HTN)  2. Bradycardia: stable, asymptomatic  3. HTN: controlled   4. Hypothyroidism: on Synthroid    Plan:   1. Continue verapamil, metoprolol, and Xarelto  2. Change Rythmol to short acting 150mg po x1 PRN sustained palpitations  3. Annual BMP and CBC (due 3/2021)   4. Monitor HR at home and call if consistently less than 50  5. Follow up in 4 weeks     Discussed decreasing either verapamil or metoprolol. Patient is asymptomatic, feeling well, and exercising. Is vigilant with monitoring HR at home. Feels very stable and feels BP is now controlled.  She agreed to

## 2020-12-01 RX ORDER — RIVAROXABAN 20 MG/1
TABLET, FILM COATED ORAL
Qty: 90 TABLET | Refills: 0 | Status: SHIPPED | OUTPATIENT
Start: 2020-12-01 | End: 2021-02-24

## 2020-12-04 ENCOUNTER — OFFICE VISIT (OUTPATIENT)
Dept: CARDIOLOGY CLINIC | Age: 80
End: 2020-12-04
Payer: MEDICARE

## 2020-12-04 VITALS
WEIGHT: 121 LBS | DIASTOLIC BLOOD PRESSURE: 60 MMHG | BODY MASS INDEX: 20.66 KG/M2 | SYSTOLIC BLOOD PRESSURE: 120 MMHG | HEART RATE: 53 BPM | OXYGEN SATURATION: 99 % | HEIGHT: 64 IN

## 2020-12-04 PROCEDURE — 1090F PRES/ABSN URINE INCON ASSESS: CPT | Performed by: NURSE PRACTITIONER

## 2020-12-04 PROCEDURE — 4040F PNEUMOC VAC/ADMIN/RCVD: CPT | Performed by: NURSE PRACTITIONER

## 2020-12-04 PROCEDURE — G8420 CALC BMI NORM PARAMETERS: HCPCS | Performed by: NURSE PRACTITIONER

## 2020-12-04 PROCEDURE — 99213 OFFICE O/P EST LOW 20 MIN: CPT | Performed by: NURSE PRACTITIONER

## 2020-12-04 PROCEDURE — 93000 ELECTROCARDIOGRAM COMPLETE: CPT | Performed by: NURSE PRACTITIONER

## 2020-12-04 PROCEDURE — 1036F TOBACCO NON-USER: CPT | Performed by: NURSE PRACTITIONER

## 2020-12-04 PROCEDURE — G8427 DOCREV CUR MEDS BY ELIG CLIN: HCPCS | Performed by: NURSE PRACTITIONER

## 2020-12-04 PROCEDURE — G8484 FLU IMMUNIZE NO ADMIN: HCPCS | Performed by: NURSE PRACTITIONER

## 2020-12-04 PROCEDURE — G8399 PT W/DXA RESULTS DOCUMENT: HCPCS | Performed by: NURSE PRACTITIONER

## 2020-12-04 PROCEDURE — 1123F ACP DISCUSS/DSCN MKR DOCD: CPT | Performed by: NURSE PRACTITIONER

## 2020-12-04 RX ORDER — PROPAFENONE HYDROCHLORIDE 150 MG/1
150 TABLET, FILM COATED ORAL DAILY PRN
Qty: 30 TABLET | Refills: 0
Start: 2020-12-04 | End: 2021-06-02 | Stop reason: DRUGHIGH

## 2020-12-04 NOTE — PROGRESS NOTES
[address]   Electrophysiology Note              Date:  December 4, 2020  Patient name: Perry Macdonald  YOB: 1940    Primary Care physician: Radha Keller MD    HISTORY OF PRESENT ILLNESS: The patient is an 78 y.o.  female with a history of PAF, sinus bradycardia, HTN, and hypothyroidism. She was admitted in 1/2013 with symptomatic afib and converted with Rythmol 600mg po X1. Echo in 2013 showed an EF of 55%. She has been maintained on a pill in the pocket approach with Rythmol. At her visit in 11/2020, her EKG showed an ectopic atrial rhythm and she had taken PRN Rythmol the night prior to the visit. She was feeling well and the PRN dose was reduced to 150mg. Today she is being seen for afib. EKG shows sinus martha with a HR of 56. She took Rythmol 150mg po x1 since last visit and reports afib terminated 4 hours later. Is feeling well otherwise and denies chest pain, shortness of breath, and dizziness. Home HR is averaging 68 and home BP is averaging 128/70. Past Medical History:   has a past medical history of Atrial fibrillation ([address] Blood circulation, collateral, Hypertension, Hypothyroidism, Lumbar scoliosis, Macular degeneration, Sciatica, and Slipped disc. Past Surgical History:   has a past surgical history that includes Cataract removal (Bilateral); Knee arthroscopy; [place] tooth extraction; and Tonsillectomy. Home Medications:    Prior to Admission medications    Medication Sig Start Date End Date Taking?  Authorizing Provider   propafenone (RYTHMOL) 150 MG tablet Take 1 tablet by mouth daily as needed (palpitations) 12/4/20  Yes DANIELA Caal CNP   XARELTO 20 MG TABS tablet TAKE ONE TABLET BY MOUTH DAILY WITH SUPPER 12/1/20  Yes DANIELA Caal CNP   estradiol (ESTRACE) 0.1 MG/GM vaginal cream INSERT ONE GRAM VAGINALLY TWICE A WEEK 9/1/20  Yes Mariano Quijano DO   verapamil (CALAN) 80 MG tablet Take 80 mg by mouth 3 times daily   Yes Historical Provider, MD   levothyroxine (SYNTHROID) 75 MCG tablet Take 1 tablet by mouth daily 2/9/17  Yes Bayron Hawk MD   metoprolol (LOPRESSOR) 100 MG tablet Take 1.5 tablets by mouth 2 times daily  Patient taking differently: Take 50 mg by mouth three times daily  7/18/16  Yes Bayron Hawk MD       Allergies:  Nsaids [nsaids]; Hctz; Polysporin [bacitracin-polymyxin b]; and Tolmetin    Social History:   reports that she has never smoked. She has never used smokeless tobacco. She reports current alcohol use of about 1.0 standard drinks of alcohol per week. She reports that she does not use drugs. Family History: family history includes Arthritis in her maternal grandfather. Review of Systems   Constitutional: Negative. HENT: Negative. Eyes: Negative. Respiratory: Negative. Cardiovascular: see HPI  Gastrointestinal: Negative. Genitourinary: Negative. Musculoskeletal: Negative. Skin: Negative. Neurological: Negative. Hematological: Negative. Psychiatric/Behavioral: Negative. PHYSICAL EXAM:    Vital signs:    /60   Pulse 53   Ht 5' 4\" (1.626 m)   Wt 121 lb (54.9 kg)   SpO2 99%   BMI 20.77 kg/m²      Constitutional and general appearance: alert, cooperative, no distress and appears stated age  HEENT: PERRL, no cervical lymphadenopathy. No masses palpable.  Normal oral mucosa  Respiratory:  · Normal excursion and expansion without use of accessory muscles  · Resp auscultation: Normal breath sounds without wheezing, rhonchi, and rales  Cardiovascular:  · The apical impulse is not displaced  · Heart tones are crisp and normal. regular S1 and S2.  · Jugular venous pulsation Normal  · The carotid upstroke is normal in amplitude and contour without delay or bruit  · Peripheral pulses are symmetrical and full   Abdomen:  · No masses or tenderness  · Bowel sounds present  Extremities:  ·  No cyanosis or clubbing  ·  Trace lower extremity edema  ·  Skin: warm and dry  Neurological:  · Alert and oriented  · Moves all extremities well  · No abnormalities of mood, affect, memory, mentation, or behavior are noted    DATA:    ECG 12/4/2020:  SB HR 56    Echo 1/5/2013:  Echocardiogram with a Doppler shows normal systolic function of left ventricle with ejection fraction of 55%. No significant valvular heart disease is seen. All labs and testing reviewed. CARDIOLOGY LABS:   CBC: No results for input(s): WBC, HGB, HCT, PLT in the last 72 hours. BMP: No results for input(s): NA, K, CO2, BUN, CREATININE, LABGLOM, GLUCOSE in the last 72 hours. PT/INR: No results for input(s): PROTIME, INR in the last 72 hours. APTT:No results for input(s): APTT in the last 72 hours. FASTING LIPID PANEL:  Lab Results   Component Value Date    HDL 92 02/09/2017    HDL 74 10/07/2010    LDLCALC 105 02/09/2017    TRIG 89 02/09/2017     LIVER PROFILE:No results for input(s): AST, ALT, ALB in the last 72 hours. Assessment:   1. Symptomatic paroxysmal atrial fibrillation: stable   -patient has been on Rythmol with a pill in the pocket approach since at least 2013   -[id number] score 4 (age, gender, HTN)  2. Bradycardia: stable, asymptomatic  3. HTN: controlled   4. Hypothyroidism: on Synthroid    Plan:   1. Continue verapamil, metoprolol, PRN Rythmol, and Xarelto  2. Will consider scheduled Rythmol if PAF increases in frequency. Patient is currently satisfied with management of afib. 3. Annual BMP and CBC (due 3/2021)   4. Monitor HR at home and call if consistently less than 50  5. Follow up in 6 months     Creatinine clearance with age 78, weight 121 lbs, and creat 0.7 is 54mL/min.     Hossein Redmond, APRN-CNP  [facility]  [phone]

## 2020-12-04 NOTE — LETTER
[address]   Electrophysiology Note              Date:  December 4, 2020  Patient name: Dinora Betts  YOB: 1940    Primary Care physician: Weston Valentino MD    HISTORY OF PRESENT ILLNESS: The patient is an 78 y.o.  female with a history of PAF, sinus bradycardia, HTN, and hypothyroidism. She was admitted in 1/2013 with symptomatic afib and converted with Rythmol 600mg po X1. Echo in 2013 showed an EF of 55%. She has been maintained on a pill in the pocket approach with Rythmol. At her visit in 11/2020, her EKG showed an ectopic atrial rhythm and she had taken PRN Rythmol the night prior to the visit. She was feeling well and the PRN dose was reduced to 150mg. Today she is being seen for afib. EKG shows sinus martha with a HR of 56. She took Rythmol 150mg po x1 since last visit and reports afib terminated 4 hours later. Is feeling well otherwise and denies chest pain, shortness of breath, and dizziness. Home HR is averaging 68 and home BP is averaging 128/70. Past Medical History:   has a past medical history of Atrial fibrillation ([address] Blood circulation, collateral, Hypertension, Hypothyroidism, Lumbar scoliosis, Macular degeneration, Sciatica, and Slipped disc. Past Surgical History:   has a past surgical history that includes Cataract removal (Bilateral); Knee arthroscopy; [place] tooth extraction; and Tonsillectomy. Home Medications:    Prior to Admission medications    Medication Sig Start Date End Date Taking?  Authorizing Provider   propafenone (RYTHMOL) 150 MG tablet Take 1 tablet by mouth daily as needed (palpitations) 12/4/20  Yes DANIELA Wells CNP   XARELTO 20 MG TABS tablet TAKE ONE TABLET BY MOUTH DAILY WITH SUPPER 12/1/20  Yes DANIELA Wells CNP   estradiol (ESTRACE) 0.1 MG/GM vaginal cream INSERT ONE GRAM VAGINALLY TWICE A WEEK 9/1/20  Yes Kermit Quijano DO verapamil (CALAN) 80 MG tablet Take 80 mg by mouth 3 times daily   Yes Historical Provider, MD   levothyroxine (SYNTHROID) 75 MCG tablet Take 1 tablet by mouth daily 2/9/17  Yes Mili Arguello MD   metoprolol (LOPRESSOR) 100 MG tablet Take 1.5 tablets by mouth 2 times daily  Patient taking differently: Take 50 mg by mouth three times daily  7/18/16  Yes Mili Arguello MD       Allergies:  Nsaids [nsaids]; Hctz; Polysporin [bacitracin-polymyxin b]; and Tolmetin    Social History:   reports that she has never smoked. She has never used smokeless tobacco. She reports current alcohol use of about 1.0 standard drinks of alcohol per week. She reports that she does not use drugs. Family History: family history includes Arthritis in her maternal grandfather. Review of Systems   Constitutional: Negative. HENT: Negative. Eyes: Negative. Respiratory: Negative. Cardiovascular: see HPI  Gastrointestinal: Negative. Genitourinary: Negative. Musculoskeletal: Negative. Skin: Negative. Neurological: Negative. Hematological: Negative. Psychiatric/Behavioral: Negative. PHYSICAL EXAM:    Vital signs:    /60   Pulse 53   Ht 5' 4\" (1.626 m)   Wt 121 lb (54.9 kg)   SpO2 99%   BMI 20.77 kg/m²      Constitutional and general appearance: alert, cooperative, no distress and appears stated age  HEENT: PERRL, no cervical lymphadenopathy. No masses palpable.  Normal oral mucosa  Respiratory:  · Normal excursion and expansion without use of accessory muscles  · Resp auscultation: Normal breath sounds without wheezing, rhonchi, and rales  Cardiovascular:  · The apical impulse is not displaced  · Heart tones are crisp and normal. regular S1 and S2.  · Jugular venous pulsation Normal  · The carotid upstroke is normal in amplitude and contour without delay or bruit  · Peripheral pulses are symmetrical and full   Abdomen:  · No masses or tenderness  · Bowel sounds present  Extremities: ·  No cyanosis or clubbing  ·  Trace lower extremity edema  ·  Skin: warm and dry  Neurological:  · Alert and oriented  · Moves all extremities well  · No abnormalities of mood, affect, memory, mentation, or behavior are noted    DATA:    ECG 12/4/2020:  SB HR 56    Echo 1/5/2013:  Echocardiogram with a Doppler shows normal systolic function of left ventricle with ejection fraction of 55%. No significant valvular heart disease is seen. All labs and testing reviewed. CARDIOLOGY LABS:   CBC: No results for input(s): WBC, HGB, HCT, PLT in the last 72 hours. BMP: No results for input(s): NA, K, CO2, BUN, CREATININE, LABGLOM, GLUCOSE in the last 72 hours. PT/INR: No results for input(s): PROTIME, INR in the last 72 hours. APTT:No results for input(s): APTT in the last 72 hours. FASTING LIPID PANEL:  Lab Results   Component Value Date    HDL 92 02/09/2017    HDL 74 10/07/2010    LDLCALC 105 02/09/2017    TRIG 89 02/09/2017     LIVER PROFILE:No results for input(s): AST, ALT, ALB in the last 72 hours. Assessment:   1. Symptomatic paroxysmal atrial fibrillation: stable   -patient has been on Rythmol with a pill in the pocket approach since at least 2013   -[id number] score 4 (age, gender, HTN)  2. Bradycardia: stable, asymptomatic  3. HTN: controlled   4. Hypothyroidism: on Synthroid    Plan:   1. Continue verapamil, metoprolol, PRN Rythmol, and Xarelto  2. Will consider scheduled Rythmol if PAF increases in frequency. Patient is currently satisfied with management of afib. 3. Annual BMP and CBC (due 3/2021)   4. Monitor HR at home and call if consistently less than 50  5. Follow up in 6 months     Creatinine clearance with age 78, weight 121 lbs, and creat 0.7 is 54mL/min.     DANIELA Briseno-CNP  [facility]  [phone]

## 2021-02-24 DIAGNOSIS — I48.0 PAROXYSMAL ATRIAL FIBRILLATION (HCC): Primary | ICD-10-CM

## 2021-02-24 DIAGNOSIS — R00.1 SINUS BRADYCARDIA: ICD-10-CM

## 2021-02-24 RX ORDER — RIVAROXABAN 20 MG/1
TABLET, FILM COATED ORAL
Qty: 90 TABLET | Refills: 0 | Status: SHIPPED | OUTPATIENT
Start: 2021-02-24 | End: 2021-05-17

## 2021-02-25 NOTE — TELEPHONE ENCOUNTER
Placed lab orders in epic. [facility] letting her know to get labs done. Pt to call with questions.

## 2021-05-17 RX ORDER — ESTRADIOL 0.1 MG/G
CREAM VAGINAL
Qty: 1 TUBE | Refills: 1 | Status: SHIPPED | OUTPATIENT
Start: 2021-05-17 | End: 2021-10-27 | Stop reason: SDUPTHER

## 2021-05-17 NOTE — TELEPHONE ENCOUNTER
LM for pt to call the office to see if medication requested and to schedule for visit. Last seen 2019.

## 2021-05-28 NOTE — PROGRESS NOTES
[facility]   Cardiac Consultation    Referring Provider:  Zack Felty, MD         HPI:  Angela Olmos is a 78 y.o. female with a history of PAF, sinus bradycardia, HTN, and hypothyroidism. She was admitted in 1/2013 with symptomatic afib and converted with Rythmol 600mg po X1. Echo in 2013 showed an EF of 55%. She has been maintained on a pill in the pocket approach with Rythmol. At her visit in 11/2020, her EKG showed an ectopic atrial rhythm and she had taken PRN Rythmol the night prior to the visit. She was feeling well and the PRN dose was reduced to 150mg. At her OV with Yue on 12/04/20 her EKG shows sinus martha with a HR of 56. She took Rythmol 150mg po x1 since last visit and reports afib terminated 4 hours later. Is feeling well otherwise and denies chest pain, shortness of breath, and dizziness. Home HR is averaging 68 and home BP is averaging 128/70. Today she reports she had 8 episodes of AF since 12/2020. She reports the episodes occur only in the AM. She reports the episodes occur usually after she wakes up. She reports she cannot take rythmol with the metoprolol as it results in bradycardia. . She usually skips the metoprolol if she takes the rythmol. She is on metoprolol 50 mg TID. Her weight has been stable. Past Medical History:   has a past medical history of Atrial fibrillation ([address] Blood circulation, collateral, Hypertension, Hypothyroidism, Lumbar scoliosis, Macular degeneration, Sciatica, and Slipped disc. Surgical History:   has a past surgical history that includes Cataract removal (Bilateral); Knee arthroscopy; [place] tooth extraction; and Tonsillectomy. Social History:   reports that she has never smoked. She has never used smokeless tobacco. She reports current alcohol use of about 1.0 standard drinks of alcohol per week. She reports that she does not use drugs. Family History:  family history includes Arthritis in her maternal grandfather. Home Medications:  Outpatient Encounter Medications as of 6/2/2021   Medication Sig Dispense Refill    estradiol (ESTRACE) 0.1 MG/GM vaginal cream INSERT ONE GRAM VAGINALLY TWICE WEEKLY 1 Tube 1    XARELTO 20 MG TABS tablet TAKE ONE TABLET BY MOUTH DAILY WITH SUPPER 90 tablet 0    propafenone (RYTHMOL) 150 MG tablet Take 1 tablet by mouth daily as needed (palpitations) 30 tablet 0    verapamil (CALAN) 80 MG tablet Take 80 mg by mouth 3 times daily      levothyroxine (SYNTHROID) 75 MCG tablet Take 1 tablet by mouth daily 90 tablet 5    metoprolol (LOPRESSOR) 100 MG tablet Take 1.5 tablets by mouth 2 times daily (Patient taking differently: Take 50 mg by mouth three times daily ) 180 tablet 3     No facility-administered encounter medications on file as of 6/2/2021. Allergies:  Nsaids [nsaids], Hctz, Polysporin [bacitracin-polymyxin b], and Tolmetin     Review of Systems   Constitutional: Negative. HENT: Negative. Eyes: Negative. Respiratory: Negative. Cardiovascular: Negative. Gastrointestinal: Negative. Genitourinary: Negative. Musculoskeletal: Negative. Skin: Negative. Neurological: Negative. Hematological: Negative. Psychiatric/Behavioral: Negative. BP (!) 126/58   Pulse 56   Ht 5' 4\" (1.626 m)   Wt 120 lb (54.4 kg)   BMI 20.60 kg/m²       Objective:  Physical Exam   Constitutional: She is oriented to person, place, and time. She appears well-developed and well-nourished. HENT:   Head: Normocephalic and atraumatic. Eyes: Pupils are equal, round, and reactive to light. Neck: Normal range of motion. Cardiovascular: Normal rate, regular rhythm and normal heart sounds. Pulmonary/Chest: Effort normal and breath sounds normal.   Abdominal: Soft. No tenderness. Musculoskeletal: Normal range of motion. She exhibits no edema. Neurological: She is alert and oriented to person, place, and time. Skin: Skin is warm and dry.    Psychiatric: She has a normal

## 2021-06-02 ENCOUNTER — TELEPHONE (OUTPATIENT)
Dept: CARDIOLOGY CLINIC | Age: 81
End: 2021-06-02

## 2021-06-02 ENCOUNTER — OFFICE VISIT (OUTPATIENT)
Dept: CARDIOLOGY CLINIC | Age: 81
End: 2021-06-02
Payer: MEDICARE

## 2021-06-02 VITALS
SYSTOLIC BLOOD PRESSURE: 126 MMHG | HEART RATE: 56 BPM | HEIGHT: 64 IN | DIASTOLIC BLOOD PRESSURE: 58 MMHG | WEIGHT: 120 LBS | BODY MASS INDEX: 20.49 KG/M2

## 2021-06-02 DIAGNOSIS — I48.0 PAROXYSMAL ATRIAL FIBRILLATION (HCC): Primary | ICD-10-CM

## 2021-06-02 PROCEDURE — 93000 ELECTROCARDIOGRAM COMPLETE: CPT | Performed by: INTERNAL MEDICINE

## 2021-06-02 PROCEDURE — G8399 PT W/DXA RESULTS DOCUMENT: HCPCS | Performed by: INTERNAL MEDICINE

## 2021-06-02 PROCEDURE — 1090F PRES/ABSN URINE INCON ASSESS: CPT | Performed by: INTERNAL MEDICINE

## 2021-06-02 PROCEDURE — 4040F PNEUMOC VAC/ADMIN/RCVD: CPT | Performed by: INTERNAL MEDICINE

## 2021-06-02 PROCEDURE — G8420 CALC BMI NORM PARAMETERS: HCPCS | Performed by: INTERNAL MEDICINE

## 2021-06-02 PROCEDURE — G8427 DOCREV CUR MEDS BY ELIG CLIN: HCPCS | Performed by: INTERNAL MEDICINE

## 2021-06-02 PROCEDURE — 1036F TOBACCO NON-USER: CPT | Performed by: INTERNAL MEDICINE

## 2021-06-02 PROCEDURE — 99214 OFFICE O/P EST MOD 30 MIN: CPT | Performed by: INTERNAL MEDICINE

## 2021-06-02 PROCEDURE — 1123F ACP DISCUSS/DSCN MKR DOCD: CPT | Performed by: INTERNAL MEDICINE

## 2021-06-02 RX ORDER — PROPAFENONE HYDROCHLORIDE 150 MG/1
150 TABLET, FILM COATED ORAL 2 TIMES DAILY
Qty: 90 TABLET | Refills: 5 | Status: SHIPPED | OUTPATIENT
Start: 2021-06-02 | End: 2021-06-15 | Stop reason: SDUPTHER

## 2021-06-02 NOTE — PATIENT INSTRUCTIONS
Plan:  1. Decrease metoprolol to 25 mg twice daily for 2 days  2. Then start Rythmol to 150 mg twice daily and hold metoprolol   3. EKG 4 days after starting rythmol on Tues  4. Follow up in 1 month with EP NP  5.  Follow up with me in 3 months

## 2021-06-02 NOTE — TELEPHONE ENCOUNTER
Pt had appt today with LUISB, pt was told to see NPBB in 1 month. Please advise what date/time to schedule.

## 2021-06-07 NOTE — TELEPHONE ENCOUNTER
Called and spoke with patient, patient us not available on 7/8, patient is getting a injection in her eye and cant rescheduled that appt. Please advise another day/time to scheduled.

## 2021-06-08 ENCOUNTER — TELEPHONE (OUTPATIENT)
Dept: CARDIOLOGY CLINIC | Age: 81
End: 2021-06-08

## 2021-06-08 ENCOUNTER — NURSE ONLY (OUTPATIENT)
Dept: CARDIOLOGY CLINIC | Age: 81
End: 2021-06-08
Payer: MEDICARE

## 2021-06-08 DIAGNOSIS — I48.0 PAROXYSMAL ATRIAL FIBRILLATION (HCC): Primary | ICD-10-CM

## 2021-06-08 PROCEDURE — 93000 ELECTROCARDIOGRAM COMPLETE: CPT | Performed by: INTERNAL MEDICINE

## 2021-06-08 NOTE — TELEPHONE ENCOUNTER
Patient cam into the office today for an EKG. She is asking if she can change Rythmol to every 8 hour dose. She is no longer taking metoprolol.

## 2021-06-08 NOTE — TELEPHONE ENCOUNTER
Pt came in for EKG, scheduled appt per NPBB. Pt is getting an injection in her eye the day before. Pt stated that if she is unable to make the appointment, she will let us know. Pt is worried she wont be able to see, and it will be unsafe to drive.

## 2021-06-10 ENCOUNTER — PATIENT MESSAGE (OUTPATIENT)
Dept: CARDIOLOGY CLINIC | Age: 81
End: 2021-06-10

## 2021-06-10 DIAGNOSIS — I48.0 PAROXYSMAL ATRIAL FIBRILLATION (HCC): ICD-10-CM

## 2021-06-14 NOTE — TELEPHONE ENCOUNTER
From: Nando Quan  To: Denisse Larson MD  Sent: 6/10/2021 11:50 PM EDT  Subject: Prescription Question    Thank you for letting me increase my Rythmol 150mg BID to q8h. Shortly after my visit on 7/8 my heart rate went up to 112 after I had taken Rythmol 10 hrs. earlier. It was not A-fib since my pulse was strong and regular, but most likely metoprolol withdrawal. I immediately took another Rythmol, and in a little over an hour my HR went down to 86. Since that episode I have been taking Rythmol q8h, my HR has been in the seventies all day, and my BP has gone from 151/89 on Wednesday to 132/73 today. No more \"episodes\", and I feel fine. I do need an order for a few more Rythmol tabs. before my appt. with VIOLET Namelor on Friday, 7-9. [facility] ([phone]) can be slow, and my appt. is right before the weekend. Thank you so much!

## 2021-06-15 RX ORDER — PROPAFENONE HYDROCHLORIDE 150 MG/1
150 TABLET, FILM COATED ORAL 2 TIMES DAILY
Qty: 90 TABLET | Refills: 5 | Status: SHIPPED | OUTPATIENT
Start: 2021-06-15 | End: 2021-06-18

## 2021-06-15 NOTE — TELEPHONE ENCOUNTER
Called patient last week but was not able to reach her. Called again today and left a message to call back to see if we have her Rythmol dose straightened out.

## 2021-06-15 NOTE — TELEPHONE ENCOUNTER
Pt returning Beronica's call regarding her Rythmol dose. Pt stated her current dose is every 8 hours. Pt stated we need to call [facility] @ [phone] and let them know it is okay to fill it. They will not fill until they hear from the office.

## 2021-06-18 DIAGNOSIS — I48.0 PAROXYSMAL ATRIAL FIBRILLATION (HCC): ICD-10-CM

## 2021-06-18 RX ORDER — PROPAFENONE HYDROCHLORIDE 150 MG/1
150 TABLET, FILM COATED ORAL EVERY 8 HOURS
Qty: 90 TABLET | Refills: 5 | Status: SHIPPED | OUTPATIENT
Start: 2021-06-18 | End: 2021-09-08 | Stop reason: SDUPTHER

## 2021-07-08 NOTE — PROGRESS NOTES
[facility]   Electrophysiology Note              Date:  July 9, 2021  Patient name: Donald Estrella  YOB: 1940    Primary Care physician: Libby Zelaya MD    HISTORY OF PRESENT ILLNESS: The patient is an 78 y.o.  female with a history of PAF, sinus bradycardia, 1st degree AVB, HTN, and hypothyroidism. She was admitted in 1/2013 with symptomatic afib and converted with Rythmol 600mg po X1. Echo in 2013 showed an EF of 55%. She has been maintained on a pill in the pocket approach with Rythmol. At her visit in 11/2020, her EKG showed an ectopic atrial rhythm and she had taken PRN Rythmol the night prior to the visit. She was feeling well and the PRN dose was reduced to 150mg. At her visit in 6/2021, she was started on scheduled Rythmol due to reported frequent PAF. Today she is being seen for afib. EKG shows SR with a 1st degree AVB and HR of 72. She is feeling well since medications were changed. Home BP is averaging 126/65-70. Denies chest pain, palpitations, shortness of breath, and dizziness. Past Medical History:   has a past medical history of Atrial fibrillation ([address] Blood circulation, collateral, Hypertension, Hypothyroidism, Lumbar scoliosis, Macular degeneration, Sciatica, and Slipped disc. Past Surgical History:   has a past surgical history that includes Cataract removal (Bilateral); Knee arthroscopy; [place] tooth extraction; and Tonsillectomy. Home Medications:    Prior to Admission medications    Medication Sig Start Date End Date Taking?  Authorizing Provider   propafenone (RYTHMOL) 150 MG tablet Take 1 tablet by mouth every 8 hours 6/18/21  Yes Lilliana Brown MD   estradiol (ESTRACE) 0.1 MG/GM vaginal cream INSERT ONE GRAM VAGINALLY TWICE WEEKLY 5/17/21  Yes Anabela Paula DO   XARELTO 20 MG TABS tablet TAKE ONE TABLET BY MOUTH DAILY WITH SUPPER 5/17/21  Yes DANIELA Carranza CNP   verapamil (CALAN) 80 MG tablet Take 120 mg by mouth 3 times daily    Yes Historical Provider, MD   levothyroxine (SYNTHROID) 75 MCG tablet Take 1 tablet by mouth daily 2/9/17  Yes Olu Larson MD       Allergies:  Nsaids [nsaids], Hctz, Polysporin [bacitracin-polymyxin b], and Tolmetin    Social History:   reports that she has never smoked. She has never used smokeless tobacco. She reports current alcohol use of about 1.0 standard drinks of alcohol per week. She reports that she does not use drugs. Family History: family history includes Arthritis in her maternal grandfather. Review of Systems   Constitutional: Negative. HENT: Negative. Eyes: Negative. Respiratory: Negative. Cardiovascular: see HPI  Gastrointestinal: Negative. Genitourinary: Negative. Musculoskeletal: Negative. Skin: Negative. Neurological: Negative. Hematological: Negative. Psychiatric/Behavioral: Negative. PHYSICAL EXAM:    Vital signs:    BP (!) 150/62   Pulse 78   Ht 5' 4\" (1.626 m)   Wt 122 lb (55.3 kg)   SpO2 98%   BMI 20.94 kg/m²      Constitutional and general appearance: alert, cooperative, no distress and appears stated age  HEENT: PERRL, no cervical lymphadenopathy. No masses palpable.  Normal oral mucosa  Respiratory:  · Normal excursion and expansion without use of accessory muscles  · Resp auscultation: Normal breath sounds without wheezing, rhonchi, and rales  Cardiovascular:  · The apical impulse is not displaced  · Heart tones are crisp and normal. regular S1 and S2.  · Jugular venous pulsation Normal  · The carotid upstroke is normal in amplitude and contour without delay or bruit  · Peripheral pulses are symmetrical and full   Abdomen:  · No masses or tenderness  · Bowel sounds present  Extremities:  ·  No cyanosis or clubbing  ·  Trace lower extremity edema  ·  Skin: warm and dry  Neurological:  · Alert and oriented  · Moves all extremities well  · No abnormalities of mood, affect, memory, mentation, or behavior are noted    DATA: ECG 7/9/2021:  SR with a 1st degree AVB and HR of 72    Echo 1/5/2013:  Echocardiogram with a Doppler shows normal systolic function of left ventricle with ejection fraction of 55%. No significant valvular heart disease is seen. All labs and testing reviewed. CARDIOLOGY LABS:   CBC: No results for input(s): WBC, HGB, HCT, PLT in the last 72 hours. BMP: No results for input(s): NA, K, CO2, BUN, CREATININE, LABGLOM, GLUCOSE in the last 72 hours. PT/INR: No results for input(s): PROTIME, INR in the last 72 hours. APTT:No results for input(s): APTT in the last 72 hours. FASTING LIPID PANEL:  Lab Results   Component Value Date    HDL 92 02/09/2017    HDL 74 10/07/2010    LDLCALC 105 02/09/2017    TRIG 89 02/09/2017     LIVER PROFILE:No results for input(s): AST, ALT, ALB in the last 72 hours. Assessment:   1. Symptomatic paroxysmal atrial fibrillation: stable   -on Rythmol    -[id number] score 4 (age, gender, HTN)  2. Bradycardia: stable, asymptomatic  3. First degree AVB: stable   4. HTN: controlled historically   5. Hypothyroidism: on Synthroid    Plan:   1. Continue verapamil, Rythmol, and Xarelto  2. Annual BMP and CBC (due 3/2022)   3. Monitor BP at home and call if consistently out of goal ranges   4. Follow up in September as scheduled     Creatinine clearance with age 78, weight 122 lbs, and creat 0.7 is 53mL/min.     MDM: DANIELA Howard-CNP  [facility]  [phone]

## 2021-07-09 ENCOUNTER — OFFICE VISIT (OUTPATIENT)
Dept: CARDIOLOGY CLINIC | Age: 81
End: 2021-07-09
Payer: MEDICARE

## 2021-07-09 VITALS
BODY MASS INDEX: 20.83 KG/M2 | WEIGHT: 122 LBS | HEIGHT: 64 IN | SYSTOLIC BLOOD PRESSURE: 138 MMHG | HEART RATE: 78 BPM | DIASTOLIC BLOOD PRESSURE: 68 MMHG | OXYGEN SATURATION: 98 %

## 2021-07-09 DIAGNOSIS — I10 ESSENTIAL HYPERTENSION: ICD-10-CM

## 2021-07-09 DIAGNOSIS — R00.1 SINUS BRADYCARDIA: ICD-10-CM

## 2021-07-09 DIAGNOSIS — I48.0 PAROXYSMAL ATRIAL FIBRILLATION (HCC): Primary | ICD-10-CM

## 2021-07-09 DIAGNOSIS — I44.0 FIRST DEGREE ATRIOVENTRICULAR BLOCK: ICD-10-CM

## 2021-07-09 PROCEDURE — 99214 OFFICE O/P EST MOD 30 MIN: CPT | Performed by: NURSE PRACTITIONER

## 2021-07-09 PROCEDURE — 1036F TOBACCO NON-USER: CPT | Performed by: NURSE PRACTITIONER

## 2021-07-09 PROCEDURE — G8399 PT W/DXA RESULTS DOCUMENT: HCPCS | Performed by: NURSE PRACTITIONER

## 2021-07-09 PROCEDURE — 1090F PRES/ABSN URINE INCON ASSESS: CPT | Performed by: NURSE PRACTITIONER

## 2021-07-09 PROCEDURE — 93000 ELECTROCARDIOGRAM COMPLETE: CPT | Performed by: NURSE PRACTITIONER

## 2021-07-09 PROCEDURE — 4040F PNEUMOC VAC/ADMIN/RCVD: CPT | Performed by: NURSE PRACTITIONER

## 2021-07-09 PROCEDURE — 1123F ACP DISCUSS/DSCN MKR DOCD: CPT | Performed by: NURSE PRACTITIONER

## 2021-07-09 PROCEDURE — G8420 CALC BMI NORM PARAMETERS: HCPCS | Performed by: NURSE PRACTITIONER

## 2021-07-09 PROCEDURE — G8427 DOCREV CUR MEDS BY ELIG CLIN: HCPCS | Performed by: NURSE PRACTITIONER

## 2021-07-09 NOTE — LETTER
[place] [address]  Phone: [phone]  Fax: [phone]     DANIELA Sneed - CNP     7/9/2021     Desire Senior MD   [address]     Patient: Artis Hendrix   MR Number: <[id number]>   YOB: 1940   Date of Visit: 7/9/2021     Dear Dr. Desire Senior     Today I saw our mutual patient named above. Below are the relevant portions of my assessment and plan of care. If you have questions, please do not hesitate to call me. I look forward to following Ovi Moody along with you. [facility], [place], operated [facility]   Electrophysiology Note              Date:  July 9, 2021  Patient name: Artis Hendrix  YOB: 1940    Primary Care physician: Desire Senior MD    HISTORY OF PRESENT ILLNESS: The patient is an 78 y.o.  female with a history of PAF, sinus bradycardia, 1st degree AVB, HTN, and hypothyroidism. She was admitted in 1/2013 with symptomatic afib and converted with Rythmol 600mg po X1. Echo in 2013 showed an EF of 55%. She has been maintained on a pill in the pocket approach with Rythmol. At her visit in 11/2020, her EKG showed an ectopic atrial rhythm and she had taken PRN Rythmol the night prior to the visit. She was feeling well and the PRN dose was reduced to 150mg. At her visit in 6/2021, she was started on scheduled Rythmol due to reported frequent PAF. Today she is being seen for afib. EKG shows SR with a 1st degree AVB and HR of 72. She is feeling well since medications were changed. Home BP is averaging 126/65-70. Denies chest pain, palpitations, shortness of breath, and dizziness. Past Medical History:   has a past medical history of Atrial fibrillation ([address] Blood circulation, collateral, Hypertension, Hypothyroidism, Lumbar scoliosis, Macular degeneration, Sciatica, and Slipped disc. Past Surgical History:   has a past surgical history that includes Cataract removal (Bilateral);  Knee arthroscopy; [place] tooth extraction; and Tonsillectomy. Home Medications:    Prior to Admission medications    Medication Sig Start Date End Date Taking? Authorizing Provider   propafenone (RYTHMOL) 150 MG tablet Take 1 tablet by mouth every 8 hours 6/18/21  Yes Yonis Hernandez MD   estradiol (ESTRACE) 0.1 MG/GM vaginal cream INSERT ONE GRAM VAGINALLY TWICE WEEKLY 5/17/21  Yes Anabela L DO Nisa   XARELTO 20 MG TABS tablet TAKE ONE TABLET BY MOUTH DAILY WITH SUPPER 5/17/21  Yes DANIELA Marcano CNP   verapamil (CALAN) 80 MG tablet Take 120 mg by mouth 3 times daily    Yes Historical Provider, MD   levothyroxine (SYNTHROID) 75 MCG tablet Take 1 tablet by mouth daily 2/9/17  Yes Shanna Gurrola MD       Allergies:  Nsaids [nsaids], Hctz, Polysporin [bacitracin-polymyxin b], and Tolmetin    Social History:   reports that she has never smoked. She has never used smokeless tobacco. She reports current alcohol use of about 1.0 standard drinks of alcohol per week. She reports that she does not use drugs. Family History: family history includes Arthritis in her maternal grandfather. Review of Systems   Constitutional: Negative. HENT: Negative. Eyes: Negative. Respiratory: Negative. Cardiovascular: see HPI  Gastrointestinal: Negative. Genitourinary: Negative. Musculoskeletal: Negative. Skin: Negative. Neurological: Negative. Hematological: Negative. Psychiatric/Behavioral: Negative. PHYSICAL EXAM:    Vital signs:    BP (!) 150/62   Pulse 78   Ht 5' 4\" (1.626 m)   Wt 122 lb (55.3 kg)   SpO2 98%   BMI 20.94 kg/m²      Constitutional and general appearance: alert, cooperative, no distress and appears stated age  HEENT: PERRL, no cervical lymphadenopathy. No masses palpable.  Normal oral mucosa  Respiratory:  · Normal excursion and expansion without use of accessory muscles  · Resp auscultation: Normal breath sounds without wheezing, rhonchi, and rales  Cardiovascular:  · The apical impulse is not displaced  · Heart tones are crisp and normal. regular S1 and S2.  · Jugular venous pulsation Normal  · The carotid upstroke is normal in amplitude and contour without delay or bruit  · Peripheral pulses are symmetrical and full   Abdomen:  · No masses or tenderness  · Bowel sounds present  Extremities:  ·  No cyanosis or clubbing  ·  Trace lower extremity edema  ·  Skin: warm and dry  Neurological:  · Alert and oriented  · Moves all extremities well  · No abnormalities of mood, affect, memory, mentation, or behavior are noted    DATA:    ECG 7/9/2021:  SR with a 1st degree AVB and HR of 72    Echo 1/5/2013:  Echocardiogram with a Doppler shows normal systolic function of left ventricle with ejection fraction of 55%. No significant valvular heart disease is seen. All labs and testing reviewed. CARDIOLOGY LABS:   CBC: No results for input(s): WBC, HGB, HCT, PLT in the last 72 hours. BMP: No results for input(s): NA, K, CO2, BUN, CREATININE, LABGLOM, GLUCOSE in the last 72 hours. PT/INR: No results for input(s): PROTIME, INR in the last 72 hours. APTT:No results for input(s): APTT in the last 72 hours. FASTING LIPID PANEL:  Lab Results   Component Value Date    HDL 92 02/09/2017    HDL 74 10/07/2010    LDLCALC 105 02/09/2017    TRIG 89 02/09/2017     LIVER PROFILE:No results for input(s): AST, ALT, ALB in the last 72 hours. Assessment:   1. Symptomatic paroxysmal atrial fibrillation: stable   -on Rythmol    -[id number] score 4 (age, gender, HTN)  2. Bradycardia: stable, asymptomatic  3. First degree AVB: stable   4. HTN: controlled historically   5. Hypothyroidism: on Synthroid    Plan:   1. Continue verapamil, Rythmol, and Xarelto  2. Annual BMP and CBC (due 3/2022)   3. Monitor BP at home and call if consistently out of goal ranges   4.  Follow up in September as scheduled     Creatinine clearance with age 78, weight 122 lbs, and creat 0.7 is

## 2021-08-16 RX ORDER — RIVAROXABAN 20 MG/1
TABLET, FILM COATED ORAL
Qty: 90 TABLET | Refills: 1 | Status: SHIPPED | OUTPATIENT
Start: 2021-08-16 | End: 2022-02-14 | Stop reason: SDUPTHER

## 2021-09-07 NOTE — PROGRESS NOTES
[facility]   Cardiac Consultation    Referring Provider:  Cale Ramos MD         HPI:  Radha Holman is a 78 y.o. female with a history of PAF, sinus bradycardia, HTN, and hypothyroidism. She was admitted in 1/2013 with symptomatic AF and converted with Rythmol 600mg po X1. Echo in 2013 showed an EF of 55%. She has been maintained on a pill in the pocket approach with Rythmol. At her visit in 11/2020, her EKG showed an ectopic atrial rhythm and she had taken PRN Rythmol the night prior to the visit. She was feeling well and the PRN dose was reduced to 150mg. At her OV with Yue on 12/04/20 her EKG shows sinus martha with a HR of 56. At her last office visit was started on scheduled Rythmol for PAF as the frequency of her AF events had increased significantly. Today she presents in Sinus Rhythm with 1st degree AVB. Patient denies any episodes of atrial fibrillation. Patient currently taking verapamil 120 mg three times a day and would like to continue taking as prescribed. Patient takes her BP once a week. Past Medical History:   has a past medical history of Atrial fibrillation ([address] Blood circulation, collateral, Hypertension, Hypothyroidism, Lumbar scoliosis, Macular degeneration, Sciatica, and Slipped disc. Surgical History:   has a past surgical history that includes Cataract removal (Bilateral); Knee arthroscopy; [place] tooth extraction; and Tonsillectomy. Social History:   reports that she has never smoked. She has never used smokeless tobacco. She reports current alcohol use of about 1.0 standard drinks of alcohol per week. She reports that she does not use drugs. Family History:  family history includes Arthritis in her maternal grandfather.      Home Medications:  Outpatient Encounter Medications as of 9/8/2021   Medication Sig Dispense Refill    XARELTO 20 MG TABS tablet TAKE ONE TABLET BY MOUTH DAILY WITH SUPPER 90 tablet 1    propafenone (RYTHMOL) 150 MG tablet Take 1 tablet by mouth every 8 hours 90 tablet 5    estradiol (ESTRACE) 0.1 MG/GM vaginal cream INSERT ONE GRAM VAGINALLY TWICE WEEKLY 1 Tube 1    verapamil (CALAN) 80 MG tablet Take 120 mg by mouth 3 times daily       levothyroxine (SYNTHROID) 75 MCG tablet Take 1 tablet by mouth daily 90 tablet 5     No facility-administered encounter medications on file as of 9/8/2021. Allergies:  Nsaids [nsaids], Hctz, Polysporin [bacitracin-polymyxin b], and Tolmetin     Review of Systems   Constitutional: Negative. HENT: Negative. Eyes: Negative. Respiratory: Negative. Cardiovascular: Negative. Gastrointestinal: Negative. Genitourinary: Negative. Musculoskeletal: Negative. Skin: Negative. Neurological: Negative. Hematological: Negative. Psychiatric/Behavioral: Negative. /78   Pulse 68   Ht 5' 4\" (1.626 m)   Wt 120 lb 8 oz (54.7 kg)   SpO2 99%   BMI 20.68 kg/m²       Objective:  Physical Exam   Constitutional: She is oriented to person, place, and time. She appears well-developed and well-nourished. HENT:   Head: Normocephalic and atraumatic. Eyes: Pupils are equal, round, and reactive to light. Neck: Normal range of motion. Cardiovascular: Normal rate, regular rhythm and normal heart sounds. Pulmonary/Chest: Effort normal and breath sounds normal.   Abdominal: Soft. No tenderness. Musculoskeletal: Normal range of motion. She exhibits no edema. Neurological: She is alert and oriented to person, place, and time. Skin: Skin is warm and dry. Psychiatric: She has a normal mood and affect. Assessment:  1. PAF-her AF frequency had increased to the point that as needed antiarrhythmic treatment was no longer reasonable. Since taking the Rythmol on a scheduled basis, she has not had a known recurrence of AF. She had always been highly symptomatic with AF in the past.  The QRS duration has remained normal on scheduled propafenone. Plan:  1. Continue scheduled propafenone  2. Follow up 6 month with EP NP    I, Roly Funez RN, am scribing for and in the presence of Dr. Kim Shipman. 09/08/21 2:46 PM  Roly Funez RN    I, Dr. Kim Shipman, personally performed the services described in this documentation as scribed by Roly Funez RN  in my presence, and it is both accurate and complete.           Kim Shipman M.D.

## 2021-09-08 ENCOUNTER — OFFICE VISIT (OUTPATIENT)
Dept: CARDIOLOGY CLINIC | Age: 81
End: 2021-09-08
Payer: MEDICARE

## 2021-09-08 VITALS
HEART RATE: 68 BPM | SYSTOLIC BLOOD PRESSURE: 138 MMHG | DIASTOLIC BLOOD PRESSURE: 78 MMHG | WEIGHT: 120.5 LBS | OXYGEN SATURATION: 99 % | HEIGHT: 64 IN | BODY MASS INDEX: 20.57 KG/M2

## 2021-09-08 DIAGNOSIS — I48.0 PAROXYSMAL ATRIAL FIBRILLATION (HCC): Primary | ICD-10-CM

## 2021-09-08 DIAGNOSIS — I10 ESSENTIAL HYPERTENSION: ICD-10-CM

## 2021-09-08 PROCEDURE — 1090F PRES/ABSN URINE INCON ASSESS: CPT | Performed by: INTERNAL MEDICINE

## 2021-09-08 PROCEDURE — G8427 DOCREV CUR MEDS BY ELIG CLIN: HCPCS | Performed by: INTERNAL MEDICINE

## 2021-09-08 PROCEDURE — 93000 ELECTROCARDIOGRAM COMPLETE: CPT | Performed by: INTERNAL MEDICINE

## 2021-09-08 PROCEDURE — G8399 PT W/DXA RESULTS DOCUMENT: HCPCS | Performed by: INTERNAL MEDICINE

## 2021-09-08 PROCEDURE — 99214 OFFICE O/P EST MOD 30 MIN: CPT | Performed by: INTERNAL MEDICINE

## 2021-09-08 PROCEDURE — 1036F TOBACCO NON-USER: CPT | Performed by: INTERNAL MEDICINE

## 2021-09-08 PROCEDURE — 4040F PNEUMOC VAC/ADMIN/RCVD: CPT | Performed by: INTERNAL MEDICINE

## 2021-09-08 PROCEDURE — 1123F ACP DISCUSS/DSCN MKR DOCD: CPT | Performed by: INTERNAL MEDICINE

## 2021-09-08 PROCEDURE — G8420 CALC BMI NORM PARAMETERS: HCPCS | Performed by: INTERNAL MEDICINE

## 2021-09-08 RX ORDER — PROPAFENONE HYDROCHLORIDE 150 MG/1
150 TABLET, FILM COATED ORAL EVERY 8 HOURS
Qty: 270 TABLET | Refills: 1 | Status: SHIPPED | OUTPATIENT
Start: 2021-09-08 | End: 2022-06-20 | Stop reason: SDUPTHER

## 2021-09-08 NOTE — LETTER
 propafenone (RYTHMOL) 150 MG tablet Take 1 tablet by mouth every 8 hours 90 tablet 5    estradiol (ESTRACE) 0.1 MG/GM vaginal cream INSERT ONE GRAM VAGINALLY TWICE WEEKLY 1 Tube 1    verapamil (CALAN) 80 MG tablet Take 120 mg by mouth 3 times daily       levothyroxine (SYNTHROID) 75 MCG tablet Take 1 tablet by mouth daily 90 tablet 5     No facility-administered encounter medications on file as of 9/8/2021. Allergies:  Nsaids [nsaids], Hctz, Polysporin [bacitracin-polymyxin b], and Tolmetin     Review of Systems   Constitutional: Negative. HENT: Negative. Eyes: Negative. Respiratory: Negative. Cardiovascular: Negative. Gastrointestinal: Negative. Genitourinary: Negative. Musculoskeletal: Negative. Skin: Negative. Neurological: Negative. Hematological: Negative. Psychiatric/Behavioral: Negative. /78   Pulse 68   Ht 5' 4\" (1.626 m)   Wt 120 lb 8 oz (54.7 kg)   SpO2 99%   BMI 20.68 kg/m²       Objective:  Physical Exam   Constitutional: She is oriented to person, place, and time. She appears well-developed and well-nourished. HENT:   Head: Normocephalic and atraumatic. Eyes: Pupils are equal, round, and reactive to light. Neck: Normal range of motion. Cardiovascular: Normal rate, regular rhythm and normal heart sounds. Pulmonary/Chest: Effort normal and breath sounds normal.   Abdominal: Soft. No tenderness. Musculoskeletal: Normal range of motion. She exhibits no edema. Neurological: She is alert and oriented to person, place, and time. Skin: Skin is warm and dry. Psychiatric: She has a normal mood and affect. Assessment:  1. PAF-her AF frequency had increased to the point that as needed antiarrhythmic treatment was no longer reasonable. Since taking the Rythmol on a scheduled basis, she has not had a known recurrence of AF.   She had always been highly symptomatic with AF in the past.  The QRS duration has remained normal on scheduled propafenone. Plan:  1. Continue scheduled propafenone  2. Follow up 6 month with EP NP    I, Anya Werner RN, am scribing for and in the presence of Dr. Ever Smith. 09/08/21 2:46 PM  Anya Werner RN    I, Dr. Ever Smith, personally performed the services described in this documentation as scribed by Anya Werner RN  in my presence, and it is both accurate and complete.           Ever Smith M.D.

## 2021-10-27 ENCOUNTER — OFFICE VISIT (OUTPATIENT)
Dept: OBGYN CLINIC | Age: 81
End: 2021-10-27
Payer: MEDICARE

## 2021-10-27 VITALS
HEART RATE: 81 BPM | SYSTOLIC BLOOD PRESSURE: 142 MMHG | TEMPERATURE: 97.3 F | WEIGHT: 122.4 LBS | BODY MASS INDEX: 21.01 KG/M2 | DIASTOLIC BLOOD PRESSURE: 72 MMHG

## 2021-10-27 DIAGNOSIS — Z53.8 PAP SMEAR OF CERVIX NOT NEEDED: ICD-10-CM

## 2021-10-27 DIAGNOSIS — Z01.419 WOMEN'S ANNUAL ROUTINE GYNECOLOGICAL EXAMINATION: Primary | ICD-10-CM

## 2021-10-27 DIAGNOSIS — N81.11 PROLAPSE OF VAGINAL WALL WITH MIDLINE CYSTOCELE: ICD-10-CM

## 2021-10-27 DIAGNOSIS — N95.2 VAGINAL ATROPHY: ICD-10-CM

## 2021-10-27 PROCEDURE — G0101 CA SCREEN;PELVIC/BREAST EXAM: HCPCS | Performed by: OBSTETRICS & GYNECOLOGY

## 2021-10-27 PROCEDURE — G8420 CALC BMI NORM PARAMETERS: HCPCS | Performed by: OBSTETRICS & GYNECOLOGY

## 2021-10-27 PROCEDURE — G8427 DOCREV CUR MEDS BY ELIG CLIN: HCPCS | Performed by: OBSTETRICS & GYNECOLOGY

## 2021-10-27 RX ORDER — ESTRADIOL 0.1 MG/G
CREAM VAGINAL
Qty: 2 G | Refills: 3 | Status: SHIPPED | OUTPATIENT
Start: 2021-10-27 | End: 2022-10-10 | Stop reason: SDUPTHER

## 2021-11-15 ASSESSMENT — ENCOUNTER SYMPTOMS
NAUSEA: 0
SHORTNESS OF BREATH: 0
DIARRHEA: 0
CONSTIPATION: 0
VOMITING: 0
BACK PAIN: 1
ABDOMINAL PAIN: 0
ABDOMINAL DISTENTION: 0

## 2021-11-15 NOTE — PROGRESS NOTES
Subjective:      Patient ID: Quinn Puckett is a 78 y.o. female. HPI  77 y/o  female presents for well woman examination. No vaginal bleeding, no vaginal discharge. Achieved menopause in 50's. Has been seen in the past for vaginal atrophy and prolapse. Notes intermittent vaginal discomfort. Symptoms are described as bladder and vaginal spasms. Voids every 3-4 hours. Experiences nocturia 2 x per night. Admits to occasional incontinence--only with bending and heavy lifting. Declines intervention aside from vaginal estrogen. No sexual activity.  passed away in 2016. Unsure if symptoms are related to low back issues (lumbar scoliosis--worsening, herniated discs x 2 and spondylolisthesis). History significant for breech delivery of 9 lb baby with final pregnancy and molar pregnancy. Review of Systems   Constitutional: Negative for activity change, appetite change, chills, fatigue, fever and unexpected weight change. Respiratory: Negative for shortness of breath. Cardiovascular: Negative for chest pain. Gastrointestinal: Negative for abdominal distention, abdominal pain, constipation, diarrhea, nausea and vomiting. Endocrine: Negative for cold intolerance and heat intolerance. Genitourinary: Positive for difficulty urinating. Negative for dyspareunia, dysuria, frequency, genital sores, hematuria, menstrual problem, pelvic pain, vaginal bleeding, vaginal discharge and vaginal pain. Musculoskeletal: Positive for back pain. Skin: Negative for rash. Neurological: Negative for headaches. Hematological: Does not bruise/bleed easily. Objective:   Physical Exam  Vitals and nursing note reviewed. Exam conducted with a chaperone present. Constitutional:       General: She is not in acute distress. Appearance: Normal appearance. She is well-developed. She is not ill-appearing or toxic-appearing. HENT:      Head: Normocephalic and atraumatic.    Neck: Thyroid: No thyromegaly. Trachea: Trachea normal.   Cardiovascular:      Rate and Rhythm: Normal rate and regular rhythm. Heart sounds: Normal heart sounds, S1 normal and S2 normal.   Pulmonary:      Effort: Pulmonary effort is normal. No respiratory distress. Breath sounds: Normal breath sounds. Chest:   Breasts: Breasts are symmetrical.      Right: No inverted nipple, mass, nipple discharge, skin change or tenderness. Left: No inverted nipple, mass, nipple discharge, skin change or tenderness. Abdominal:      General: Bowel sounds are normal. There is no distension. Palpations: Abdomen is soft. Abdomen is not rigid. There is no mass. Tenderness: There is no abdominal tenderness. There is no guarding or rebound. Genitourinary:     General: Normal vulva. Exam position: Lithotomy position. Labia:         Right: No rash, tenderness, lesion or injury. Left: No rash, tenderness, lesion or injury. Vagina: No signs of injury. No vaginal discharge, erythema, tenderness or bleeding. Cervix: No cervical motion tenderness, discharge or friability. Uterus: Not tender. Adnexa:         Right: No mass, tenderness or fullness. Left: No mass, tenderness or fullness. Comments: Vaginal atrophy, mild cystocele appreciated--unchanged. Musculoskeletal:         General: Normal range of motion. Cervical back: Neck supple. Skin:     General: Skin is warm and dry. Findings: No erythema or rash. Nails: There is no clubbing. Neurological:      Mental Status: She is alert and oriented to person, place, and time. Psychiatric:         Speech: Speech normal.         Behavior: Behavior normal. Behavior is cooperative. Thought Content: Thought content normal.         Judgment: Judgment normal.         Assessment:       Diagnosis Orders   1. Women's annual routine gynecological examination     2.  Pap smear of cervix not needed     3. Vaginal atrophy     4. Prolapse of vaginal wall with midline cystocele             Plan:      Rx refill vaginal estrogen  Follow up prn and 1 year for well woman examination.            Aleksandar Quijano,

## 2022-02-14 DIAGNOSIS — I48.0 PAROXYSMAL ATRIAL FIBRILLATION (HCC): Primary | ICD-10-CM

## 2022-02-14 NOTE — TELEPHONE ENCOUNTER
9/2021 last visit [facility]       LAST CBC and BMP 3/2021 (CARE EVERYWHERE). Patient is due for lab work next month. Ordered in EPIC. Left detailed message on VM letting patient know she needs to have labs completed in the next month. Will pend med with no refills.

## 2022-02-14 NOTE — TELEPHONE ENCOUNTER
Pt is requesting refill of Xarelto 20mg. Preferred pharmacy is [facility] [phone]. last ov 09/08/2021 mayda. Pt will be out of medication this week.

## 2022-03-16 ENCOUNTER — TELEPHONE (OUTPATIENT)
Dept: CARDIOLOGY CLINIC | Age: 82
End: 2022-03-16

## 2022-03-18 NOTE — TELEPHONE ENCOUNTER
Spoke with pt, relayed message from NPAM. Pt stated that she has a follow up with PCP and her PCP is aware of  cholesterol level.

## 2022-03-18 NOTE — TELEPHONE ENCOUNTER
Lab work pertinent to EP is stable. However, her cholesterol and TSH are abnormal. Would defer to Dr. Cathrine Dandy. Thanks.

## 2022-03-18 NOTE — TELEPHONE ENCOUNTER
YEHUDA is OOT. Will send to NP. Care everywhere updated for her to see labs. Please advise on labs.  TY.

## 2022-05-23 DIAGNOSIS — I48.0 PAROXYSMAL ATRIAL FIBRILLATION (HCC): ICD-10-CM

## 2022-05-23 NOTE — TELEPHONE ENCOUNTER
Pt needs appt for further refills. Per [facility] note 09/09/2021:  Plan:  1. Continue scheduled propafenone  2.  Follow up 6 month with EP NP

## 2022-05-26 NOTE — TELEPHONE ENCOUNTER
Pt returned [facility] phone call, pt is scheduled for next available appointment w/HERMAN 07/13 @ 230pm at [facility].      Please send refills to:    [facility] [id number] [address] [facility] [phone] [address] [facility], [address]   Phone:  [phone]  Fax:  [phone]

## 2022-05-26 NOTE — TELEPHONE ENCOUNTER
Pt/pharmacy requesting Xarelto 20 mg tab. Pending script sent to [facility].     Last OV 9/8/21  Last CBC 2/9/17          BMP 1/4/13    Recent CBC BMP orders placed 2/14/22, not yet completed by pt

## 2022-06-20 DIAGNOSIS — I48.0 PAROXYSMAL ATRIAL FIBRILLATION (HCC): ICD-10-CM

## 2022-06-20 RX ORDER — PROPAFENONE HYDROCHLORIDE 150 MG/1
150 TABLET, FILM COATED ORAL EVERY 8 HOURS
Qty: 90 TABLET | Refills: 0 | Status: SHIPPED | OUTPATIENT
Start: 2022-06-20 | End: 2022-07-13 | Stop reason: SDUPTHER

## 2022-06-20 NOTE — TELEPHONE ENCOUNTER
Last OV 9/8/21 [facility]  Last CBC 3/14/2022 (Care Everywhere)          BMP 3/14/2022 (Care Everywhere)    Next OV [facility] 7/13/2022    JMB-med pending

## 2022-06-20 NOTE — TELEPHONE ENCOUNTER
Medication Refill    Medication needing refilled:propafenone (RYTHMOL) 150 MG tablet     Dosage of the medication: 150 mg     How are you taking this medication (QD, BID, TID, QID, PRN): Take 1 tablet by mouth every 8 hours    30 or 90 day supply called in: 270     When will you run out of your medication: 3 days     Which Pharmacy are we sending the medication to?:      [facility] [id number] - [id number], [address] [facility] [phone]   [address] [place] [address]   Phone:  [phone]  Fax:  [phone]

## 2022-07-12 NOTE — PROGRESS NOTES
[address]   Electrophysiology Outpatient Note              Date:  July 13, 2022  Patient name: Dimitry Capellan  YOB: 1940    Primary Care physician: Raz Gonzalez MD    HISTORY OF PRESENT ILLNESS: The patient is a 78 y.o.  female with a history of PAF, SB, 1st degree AVB, HTN and hypothyroidism. In 1/2013 she was admitted for AF and converted with Rythmol. Echo showed an EF of 55%. She was maintained with the pill in the pocket approach with Rythmol. In 11/2020 at [address], her EKG showed ectopic atrial rhythm and she had taken PRN Rythmol the night prior to visit. PRN dose reduced to 150mg. In 6/2021 at [address] [facility], she was started on scheduled Rythmol due to frequent PAF. Today she is being seen for PAF. EKG shows SR with 1st degree AVB with a HR of 63 bpm. Things are going well for her at home, she has no complaints today. She has not felt any episodes of AF. She is active doing yard work and walking her 2 dogs. She denies chest pain, palpitations, and dizziness. She reports slight shortness of breath with stairs when her back is really hurting her. She is doing well on her current medications. She has not had any blood in her urine or stool. No falls. BP can be sporadic, but is normally in the 120s/60s. Past Medical History:   has a past medical history of Atrial fibrillation ([address] Blood circulation, collateral, Hypertension, Hypothyroidism, Lumbar scoliosis, Macular degeneration, Sciatica, and Slipped disc. Past Surgical History:   has a past surgical history that includes Cataract removal (Bilateral); Knee arthroscopy; [place] tooth extraction; and Tonsillectomy. Home Medications:    Prior to Admission medications    Medication Sig Start Date End Date Taking?  Authorizing Provider   propafenone (RYTHMOL) 150 MG tablet Take 1 tablet by mouth every 8 hours 6/20/22  Yes Yobany Arroyo MD   rivaroxaban (XARELTO) 20 MG TABS tablet TAKE ONE TABLET BY MOUTH DAILY WITH SUPPER 5/26/22  Yes Nimesh Nevarez MD   estradiol (ESTRACE) 0.1 MG/GM vaginal cream INSERT ONE GRAM VAGINALLY THREE TIMES WEEKLY 10/27/21  Yes Matt Quijano DO   verapamil (CALAN) 80 MG tablet Take 120 mg by mouth 3 times daily    Yes Historical Provider, MD   levothyroxine (SYNTHROID) 75 MCG tablet Take 1 tablet by mouth daily 2/9/17  Yes Falguni King MD     Allergies:  Nsaids [nsaids], Hctz, Polysporin [bacitracin-polymyxin b], and Tolmetin    Social History:   reports that she has never smoked. She has never used smokeless tobacco. She reports current alcohol use of about 1.0 standard drink of alcohol per week. She reports that she does not use drugs. Family History: family history includes Arthritis in her maternal grandfather. All 14 point review of systems are completed and pertinent positives are mentioned in the history of present illness. Other systems are reviewed and are negative. PHYSICAL EXAM:    Vital signs:    /60   Pulse 66   Ht 5' 4\" (1.626 m)   Wt 120 lb (54.4 kg)   SpO2 97%   BMI 20.60 kg/m²      Constitutional and general appearance: alert, cooperative, no distress and appears stated age  HEENT: PERRL, no cervical lymphadenopathy. No masses palpable.  Normal oral mucosa  Respiratory:  · Normal excursion and expansion without use of accessory muscles  · Resp auscultation: Normal breath sounds without wheezing, rhonchi, and rales  Cardiovascular:  · The apical impulse is not displaced  · Heart tones are crisp and normal. regular S1 and S2.  · Jugular venous pulsation Normal  · The carotid upstroke is normal in amplitude and contour without delay or bruit  · Peripheral pulses are symmetrical and full   Abdomen:  · No masses or tenderness  · Bowel sounds present  Extremities:  ·  No cyanosis or clubbing  ·  Trace lower extremity edema, compression stockings in place  ·  Skin: warm and dry  Neurological:  · Alert and oriented  · Moves all extremities well  · No abnormalities of mood, affect, memory, mentation, or behavior are noted    DATA:    ECG 7/13/2022:  SR w/ 1st degree AVB, HR 63 bpm    Echo 1/5/2013:  Echocardiogram with a Doppler shows normal systolic function of left ventricle with ejection fraction of 55%. No significant valvular heart disease is seen. All labs and testing reviewed. CARDIOLOGY LABS:   CBC: No results for input(s): WBC, HGB, HCT, PLT in the last 72 hours. BMP: No results for input(s): NA, K, CO2, BUN, CREATININE, LABGLOM, GLUCOSE in the last 72 hours. PT/INR: No results for input(s): PROTIME, INR in the last 72 hours. APTT:No results for input(s): APTT in the last 72 hours. FASTING LIPID PANEL:  Lab Results   Component Value Date/Time    HDL 92 02/09/2017 04:41 PM    HDL 74 10/07/2010 02:13 PM    LDLCALC 105 02/09/2017 04:41 PM    TRIG 89 02/09/2017 04:41 PM     LIVER PROFILE:No results for input(s): AST, ALT, ALB in the last 72 hours. IMPRESSION:    Patient Active Problem List   Diagnosis    HTN (hypertension)    Hypothyroidism    Macrocytosis    Paroxysmal atrial fibrillation (HCC)    Metatarsal fracture    Tibial plateau fracture    Vaginal atrophy    Urethral caruncle    Pap smear of cervix not needed    Prolapse of vaginal wall with midline cystocele    Breast mass, left    Sinus bradycardia    First degree atrioventricular block     Assessment:   Symptomatic paroxysmal atrial fibrillation: ongoing, stable              -on Rythmol               -[id number] score 4 (age, gender, HTN)  Sinus bradycardia: stable, asymptomatic  First degree AVB: stable   HTN: controlled  Hypothyroidism on Synthroid      Plan:   1. Decrease Xarelto to 15 mg daily due to CrCl of 50 mL/min  2. Continue verapamil and Rythmol  3. Annual CBC and BMP due 3/2023. [facility] at [facility] labs. [facility]. 4. Monitor BP at home and call if consistently out of goal ranges  5.  Follow up in 1 year or sooner if needed    Creatinine clearance with age 78 y.o., weight of 120 lbs and creatinine of 0.72 is 50 mL/min.       [facility] moderate         DANIELA Casanova-CNP  [address]  [phone]

## 2022-07-13 ENCOUNTER — OFFICE VISIT (OUTPATIENT)
Dept: CARDIOLOGY CLINIC | Age: 82
End: 2022-07-13
Payer: MEDICARE

## 2022-07-13 VITALS
BODY MASS INDEX: 20.49 KG/M2 | SYSTOLIC BLOOD PRESSURE: 130 MMHG | DIASTOLIC BLOOD PRESSURE: 60 MMHG | HEIGHT: 64 IN | WEIGHT: 120 LBS | HEART RATE: 66 BPM | OXYGEN SATURATION: 97 %

## 2022-07-13 DIAGNOSIS — I44.0 FIRST DEGREE ATRIOVENTRICULAR BLOCK: ICD-10-CM

## 2022-07-13 DIAGNOSIS — I10 ESSENTIAL HYPERTENSION: ICD-10-CM

## 2022-07-13 DIAGNOSIS — R00.1 SINUS BRADYCARDIA: ICD-10-CM

## 2022-07-13 DIAGNOSIS — I48.0 PAROXYSMAL ATRIAL FIBRILLATION (HCC): Primary | ICD-10-CM

## 2022-07-13 PROCEDURE — 99214 OFFICE O/P EST MOD 30 MIN: CPT

## 2022-07-13 PROCEDURE — 1123F ACP DISCUSS/DSCN MKR DOCD: CPT

## 2022-07-13 PROCEDURE — 93000 ELECTROCARDIOGRAM COMPLETE: CPT

## 2022-07-13 RX ORDER — PROPAFENONE HYDROCHLORIDE 150 MG/1
150 TABLET, FILM COATED ORAL EVERY 8 HOURS
Qty: 270 TABLET | Refills: 1 | Status: SHIPPED | OUTPATIENT
Start: 2022-07-13

## 2022-07-13 NOTE — PATIENT INSTRUCTIONS
Reduce Xarelto to 15 mg daily (new prescription sent to [facility])    No other changes to medications     Monitor BP at home and call if consistently out of goal ranges    Follow up in 1 year or sooner if needed

## 2022-10-10 RX ORDER — ESTRADIOL 0.1 MG/G
CREAM VAGINAL
Qty: 2 G | Refills: 3 | Status: SHIPPED | OUTPATIENT
Start: 2022-10-10

## 2022-10-10 NOTE — TELEPHONE ENCOUNTER
December 24, 2021      Jessie HEBERT Dawn  [address] [facility] [place] [address]        To Whom It May Concern:  Please excuse patient until they receive a negative COVID test. Upon negative test, they may return.         Sincerely,        Sukhdev Rosenberg PA-C           Pt calling for refill of estrace.  She was placed on Recall for annual. Please sign or advise

## 2023-01-09 DIAGNOSIS — I48.0 PAROXYSMAL ATRIAL FIBRILLATION (HCC): ICD-10-CM

## 2023-01-09 RX ORDER — PROPAFENONE HYDROCHLORIDE 150 MG/1
150 TABLET, FILM COATED ORAL EVERY 8 HOURS
Qty: 270 TABLET | Refills: 0 | Status: SHIPPED | OUTPATIENT
Start: 2023-01-09

## 2023-01-09 NOTE — TELEPHONE ENCOUNTER
Last OV [facility] 7/13/22  No upcoming OV   EKG 7/1322  CMP & CBC 3/14/22 - care everywhere

## 2023-07-12 ENCOUNTER — OFFICE VISIT (OUTPATIENT)
Dept: CARDIOLOGY CLINIC | Age: 83
End: 2023-07-12

## 2023-07-12 VITALS
HEART RATE: 62 BPM | SYSTOLIC BLOOD PRESSURE: 126 MMHG | DIASTOLIC BLOOD PRESSURE: 72 MMHG | BODY MASS INDEX: 20.66 KG/M2 | WEIGHT: 121 LBS | HEIGHT: 64 IN

## 2023-07-12 DIAGNOSIS — R00.1 SINUS BRADYCARDIA: ICD-10-CM

## 2023-07-12 DIAGNOSIS — I48.0 PAROXYSMAL ATRIAL FIBRILLATION (HCC): Primary | ICD-10-CM

## 2023-07-12 DIAGNOSIS — I44.0 FIRST DEGREE ATRIOVENTRICULAR BLOCK: ICD-10-CM

## 2023-07-12 NOTE — PROGRESS NOTES
[facility]   Cardiac Consultation  Date: 7/12/23  Patient Name: Kulwant Cook  YOB: 1940    Primary Care Physician: Stephy Welsh MD    CHIEF COMPLAINT:   Chief Complaint   Patient presents with    1 Year Follow Up    Atrial Fibrillation    Other     Sinus bradycardia and first degree AV Block       HPI:  Kulwant Cook is a 78 y.o. female with a history of PAF, sinus bradycardia, HTN, and hypothyroidism. She was admitted in 1/2013 with symptomatic AF and converted with Rythmol 600mg po X1. Echo in 2013 showed an EF of 55%. She had been maintained on a pill in the pocket approach with Rythmol. At her visit in 11/2020, her EKG showed an ectopic atrial rhythm and she had taken PRN Rythmol the night prior to the visit. She was feeling well and the PRN dose was reduced to 150mg. At her OV with Yue on 12/04/20 her EKG shows sinus martha with a HR of 56. At her last office visit was started on scheduled Rythmol for PAF as the frequency of her AF events had increased significantly. On 09/08/2021 she presented in Sinus Rhythm with 1st degree AVB. Today, 07/12/2023, she reports that she feels well overall. Her AF episodes are easily recognizable as she feels short of breath with rapid palpitations. She has not had another episode since starting on scheduled Rythmol. She remains active caring for her great grandchildren and her dogs. Patient denies current edema, chest pain, sob, palpitations, dizziness or syncope. Patient is taking all cardiac medications as prescribed and tolerates them well. Past Medical History:   has a past medical history of Atrial fibrillation ([address]), Blood circulation, collateral, Hypertension, Hypothyroidism, Lumbar scoliosis, Macular degeneration, Sciatica, and Slipped disc. Surgical History:   has a past surgical history that includes Cataract removal (Bilateral); Knee arthroscopy; [place] tooth extraction; and Tonsillectomy.      Social History:

## 2023-07-12 NOTE — PATIENT INSTRUCTIONS
Plan:  Monitor for AF episodes   Continue taking cardiac medications as prescribed   Follow up with EPNP in 6 months

## 2023-07-14 RX ORDER — PROPAFENONE HYDROCHLORIDE 150 MG/1
TABLET, COATED ORAL
Qty: 270 TABLET | Refills: 3 | Status: SHIPPED | OUTPATIENT
Start: 2023-07-14

## 2023-08-23 DIAGNOSIS — I48.0 PAROXYSMAL ATRIAL FIBRILLATION (HCC): ICD-10-CM

## 2023-08-23 NOTE — TELEPHONE ENCOUNTER
Medication Refill    Medication needing refilled:Xarelto    Dosage of the medication:5mg    How are you taking this medication (QD, BID, TID, QID, PRN):qd    Which Pharmacy are we sending the medication to?:    [facility] [id number] - [id number], [address] [place] [address] Brad Moreland [phone]   [address] [place] [address], [address] [place] [address]   Phone:  [phone]  Fax:  [phone]

## 2023-08-24 DIAGNOSIS — I48.0 PAROXYSMAL ATRIAL FIBRILLATION (HCC): ICD-10-CM

## 2023-08-25 RX ORDER — ESTRADIOL 0.1 MG/G
CREAM VAGINAL
Qty: 2 G | Refills: 3 | Status: SHIPPED | OUTPATIENT
Start: 2023-08-25

## 2023-09-12 RX ORDER — ESTRADIOL 0.1 MG/G
CREAM VAGINAL
Qty: 42.5 G | OUTPATIENT
Start: 2023-09-12

## 2024-02-12 DIAGNOSIS — I48.0 PAROXYSMAL ATRIAL FIBRILLATION (HCC): ICD-10-CM

## 2024-07-08 DIAGNOSIS — I48.0 PAROXYSMAL ATRIAL FIBRILLATION (HCC): ICD-10-CM

## 2024-07-08 RX ORDER — PROPAFENONE HYDROCHLORIDE 150 MG/1
TABLET, COATED ORAL
Qty: 270 TABLET | Refills: 0 | Status: SHIPPED | OUTPATIENT
Start: 2024-07-08

## 2024-07-08 NOTE — TELEPHONE ENCOUNTER
7/8- called pt @[phone] [facility] informing pt to call back to schedule annual appt for continuation of med refills.     7/12/2023 YEHUDA (per LOV pt was advised to f/u in 6m with EPNP)

## 2024-10-01 DIAGNOSIS — I48.0 PAROXYSMAL ATRIAL FIBRILLATION (HCC): ICD-10-CM

## 2024-10-04 RX ORDER — PROPAFENONE HYDROCHLORIDE 150 MG/1
TABLET, COATED ORAL
Qty: 270 TABLET | Refills: 0 | Status: SHIPPED | OUTPATIENT
Start: 2024-10-04

## 2024-10-08 NOTE — PROGRESS NOTES
[facility]   Cardiac Follow up  Date: 10/9/24  Patient Name: Lisha Sloan  YOB: 1940    Primary Care Physician: Jitendra Jarrell MD    CHIEF COMPLAINT:   Chief Complaint   Patient presents with    1 Year Follow Up    Atrial Fibrillation    Other     Bradycardia       HPI:  Lisha Sloan is a 84 y.o. female with a history of PAF, sinus bradycardia, HTN, and hypothyroidism. She was admitted in 1/2013 with symptomatic AF and converted with Rythmol 600mg po X1. Echo in 2013 showed an EF of 55%. She had been maintained on a pill in the pocket approach with Rythmol. At her visit in 11/2020, her EKG showed an ectopic atrial rhythm and she had taken PRN Rythmol the night prior to the visit. She was feeling well and the PRN dose was reduced to 150mg. At her OV with Yue on 12/04/20 her EKG shows sinus martha with a HR of 56. At her last office visit was started on scheduled Rythmol for PAF as the frequency of her AF events had increased significantly.   On 09/08/2021 she presented in Sinus Rhythm with 1st degree AVB.   On 7/12/2023, she reported her AF episodes are easily recognizable as she feels short of breath with rapid palpitations. She has not had another episode since starting on scheduled Rythmol. She remains active caring for her great grandchildren and her dogs.   Today, patient reports she is doing well. She has not had any palpitations since her last office visit. She stays active with yard work and chores around the house and tolerates those activities well. Patient is taking all cardiac medications as prescribed and tolerates them well. Patient denies current edema, chest pain, shortness of breath, dizziness or syncope.     Past Medical History:   has a past medical history of Atrial fibrillation (HCC), Blood circulation, collateral, Hypertension, Hypothyroidism, Lumbar scoliosis, Macular degeneration, Sciatica, and Slipped disc.    Surgical History:   has a past surgical

## 2024-10-09 ENCOUNTER — OFFICE VISIT (OUTPATIENT)
Dept: CARDIOLOGY CLINIC | Age: 84
End: 2024-10-09

## 2024-10-09 ENCOUNTER — TELEPHONE (OUTPATIENT)
Dept: CARDIOLOGY CLINIC | Age: 84
End: 2024-10-09

## 2024-10-09 ENCOUNTER — ANCILLARY PROCEDURE (OUTPATIENT)
Dept: CARDIOLOGY CLINIC | Age: 84
End: 2024-10-09
Payer: MEDICARE

## 2024-10-09 VITALS
SYSTOLIC BLOOD PRESSURE: 116 MMHG | WEIGHT: 123.2 LBS | DIASTOLIC BLOOD PRESSURE: 62 MMHG | HEIGHT: 64 IN | HEART RATE: 87 BPM | OXYGEN SATURATION: 98 % | BODY MASS INDEX: 21.03 KG/M2

## 2024-10-09 DIAGNOSIS — R00.1 SINUS BRADYCARDIA: ICD-10-CM

## 2024-10-09 DIAGNOSIS — I48.0 PAROXYSMAL ATRIAL FIBRILLATION (HCC): Primary | ICD-10-CM

## 2024-10-09 DIAGNOSIS — I48.0 PAROXYSMAL ATRIAL FIBRILLATION (HCC): ICD-10-CM

## 2024-10-09 NOTE — PATIENT INSTRUCTIONS
Plan:  2 week cardiac event monitor to reassess AF and SB.   Continue cardiac medications as prescribed.   Continue activity as tolerated.   Follow up in 3 months.

## 2024-10-09 NOTE — TELEPHONE ENCOUNTER
Monitor placed by AL MA  Monitor company [employer]  Length of monitor 2 weeks  Monitor ordered by YEHUDA  Bluetooth ID [id number]  Activation successful prior to pt leaving office? Yes

## 2024-10-23 RX ORDER — ESTRADIOL 0.1 MG/G
CREAM VAGINAL
Qty: 42.5 G | OUTPATIENT
Start: 2024-10-23

## 2024-10-31 ENCOUNTER — OFFICE VISIT (OUTPATIENT)
Dept: OBGYN CLINIC | Age: 84
End: 2024-10-31

## 2024-10-31 ENCOUNTER — TELEPHONE (OUTPATIENT)
Dept: CARDIOLOGY CLINIC | Age: 84
End: 2024-10-31

## 2024-10-31 VITALS
HEART RATE: 68 BPM | OXYGEN SATURATION: 96 % | SYSTOLIC BLOOD PRESSURE: 116 MMHG | WEIGHT: 124.8 LBS | DIASTOLIC BLOOD PRESSURE: 70 MMHG | BODY MASS INDEX: 21.41 KG/M2

## 2024-10-31 DIAGNOSIS — Z53.8 PAP SMEAR OF CERVIX NOT NEEDED: ICD-10-CM

## 2024-10-31 DIAGNOSIS — Z01.419 WOMEN'S ANNUAL ROUTINE GYNECOLOGICAL EXAMINATION: Primary | ICD-10-CM

## 2024-10-31 DIAGNOSIS — N36.2 URETHRAL CARUNCLE: ICD-10-CM

## 2024-10-31 DIAGNOSIS — N95.2 VAGINAL ATROPHY: ICD-10-CM

## 2024-10-31 LAB — ECHO BSA: 1.59 M2

## 2024-10-31 PROCEDURE — 93228 REMOTE 30 DAY ECG REV/REPORT: CPT | Performed by: INTERNAL MEDICINE

## 2024-10-31 RX ORDER — ESTRADIOL 0.1 MG/G
CREAM VAGINAL
Qty: 42.5 G | Refills: 5 | Status: SHIPPED | OUTPATIENT
Start: 2024-10-31

## 2024-10-31 RX ORDER — VERAPAMIL HYDROCHLORIDE 120 MG/1
120 TABLET ORAL DAILY
COMMUNITY
Start: 2024-09-10

## 2024-11-08 DIAGNOSIS — I48.0 PAROXYSMAL ATRIAL FIBRILLATION (HCC): ICD-10-CM

## 2024-11-08 NOTE — TELEPHONE ENCOUNTER
Last Office Visit: 10/9/2024 Provider: YEHUDA  Is provider OOT? Yes    Next Office Visit: 11/27/2024 Provider: YEHUDA    **Has patient already had 30 day supply? No    LAST LABS:   CBC:       **Check Care Everywhere? yes -    **Lab orders needed? yes - BMP, CBC     Is encounter provider correct?   Yes  Does refill dosage match last filled?   Yes  Changes to script from Tele Encounters or LOV Plan?   no  Did you adjust dispensed amount or refills to reflect last and upcoming OV?  Yes    Requested Prescriptions     Pending Prescriptions Disp Refills    rivaroxaban (XARELTO) 15 MG TABS tablet 90 tablet 2     Sig: TAKE 1 TABLET BY MOUTH DAILY WITH DINNER      Called and spoke w/ pt and advised cbc and bmp needs completed. Pt states she will go next week.

## 2024-11-11 ENCOUNTER — HOSPITAL ENCOUNTER (OUTPATIENT)
Age: 84
Discharge: HOME | End: 2024-11-11
Payer: MEDICARE

## 2024-11-11 LAB
ANION GAP SERPL CALCULATED.3IONS-SCNC: 12 MMOL/L (ref 3–16)
BASOPHILS # BLD: 0 K/UL (ref 0–0.2)
BASOPHILS NFR BLD: 0.6 %
BUN SERPL-MCNC: 17 MG/DL (ref 7–20)
CALCIUM SERPL-MCNC: 9.4 MG/DL (ref 8.3–10.6)
CHLORIDE SERPL-SCNC: 101 MMOL/L (ref 99–110)
CO2 SERPL-SCNC: 24 MMOL/L (ref 21–32)
CREAT SERPL-MCNC: 0.7 MG/DL (ref 0.6–1.2)
DEPRECATED RDW RBC AUTO: 13.9 % (ref 12.4–15.4)
EOSINOPHIL # BLD: 0.1 K/UL (ref 0–0.6)
EOSINOPHIL NFR BLD: 2.1 %
GFR SERPLBLD CREATININE-BSD FMLA CKD-EPI: 84 ML/MIN/{1.73_M2}
GLUCOSE SERPL-MCNC: 94 MG/DL (ref 70–99)
HCT VFR BLD AUTO: 40.6 % (ref 36–48)
HGB BLD-MCNC: 13.8 G/DL (ref 12–16)
LYMPHOCYTES # BLD: 1.6 K/UL (ref 1–5.1)
LYMPHOCYTES NFR BLD: 34.7 %
MCH RBC QN AUTO: 33.8 PG (ref 26–34)
MCHC RBC AUTO-ENTMCNC: 34.1 G/DL (ref 31–36)
MCV RBC AUTO: 99.3 FL (ref 80–100)
MONOCYTES # BLD: 0.7 K/UL (ref 0–1.3)
MONOCYTES NFR BLD: 15 %
NEUTROPHILS # BLD: 2.2 K/UL (ref 1.7–7.7)
NEUTROPHILS NFR BLD: 47.6 %
PLATELET # BLD AUTO: 197 K/UL (ref 135–450)
PMV BLD AUTO: 9.1 FL (ref 5–10.5)
POTASSIUM SERPL-SCNC: 4.2 MMOL/L (ref 3.5–5.1)
RBC # BLD AUTO: 4.09 M/UL (ref 4–5.2)
SODIUM SERPL-SCNC: 137 MMOL/L (ref 136–145)
WBC # BLD AUTO: 4.7 K/UL (ref 4–11)

## 2024-11-11 PROCEDURE — 80048 BASIC METABOLIC PNL TOTAL CA: CPT

## 2024-11-11 PROCEDURE — 36415 COLL VENOUS BLD VENIPUNCTURE: CPT

## 2024-11-11 PROCEDURE — 85025 COMPLETE CBC W/AUTO DIFF WBC: CPT

## 2024-11-13 NOTE — TELEPHONE ENCOUNTER
Labs completed. [facility] OOT. Pls review med request.    LAST LABS:   CBC:   Lab Results   Component Value Date    WBC 4.7 11/11/2024    HGB 13.8 11/11/2024    HCT 40.6 11/11/2024    MCV 99.3 11/11/2024     11/11/2024     BMP:   Lab Results   Component Value Date/Time     11/11/2024 04:04 PM    K 4.2 11/11/2024 04:04 PM     11/11/2024 04:04 PM    CO2 24 11/11/2024 04:04 PM    BUN 17 11/11/2024 04:04 PM    CREATININE 0.7 11/11/2024 04:04 PM    GLUCOSE 94 11/11/2024 04:04 PM    CALCIUM 9.4 11/11/2024 04:04 PM    LABGLOM 84 11/11/2024 04:04 PM

## 2024-11-26 NOTE — PROGRESS NOTES
Ht 1.626 m (5' 4.02\")   Wt 55.8 kg (123 lb)   SpO2 98%   BMI 21.10 kg/m²     Data:  ECG 11/27/24: Personally reviewed.     All current cardiac medications reviewed and adjusted accordingly  11/27/24    Objective:  Physical Exam   Constitutional: She is oriented to person, place, and time. She appears well-developed and well-nourished.   HENT:   Head: Normocephalic and atraumatic.   Eyes: Pupils are equal, round, and reactive to light.   Neck: Normal range of motion.   Cardiovascular: Normal rate, regular rhythm and normal heart sounds.    Pulmonary/Chest: Effort normal and breath sounds normal.   Abdominal: Soft. No tenderness.   Musculoskeletal: Normal range of motion. She exhibits no edema.   Neurological: She is alert and oriented to person, place, and time.   Skin: Skin is warm and dry.   Psychiatric: She has a normal mood and affect.     Assessment:  PAF- her AF frequency had increased to the point that as needed antiarrhythmic treatment was no longer reasonable. Since taking the Rythmol on a scheduled basis, she has not had a known recurrence of AF.  She had always been highly symptomatic with AF in the past.  The QRS duration has remained normal on scheduled propafenone. Possible organized AT on ECG 10/9/2024.  IRMA from 10/09/2024 to 10/22/2024 which demonstrated persistent AT with an average HR of 85 () BPM. PAC burden 1.70%, PVC burden 0.02%, AF/AT burden 17.75%. Appears to be slow AT on monitor and ECG on 11/27/2024.  Though her rhythm is not sinus, she has a regular narrow QRS rhythm which does demonstrate some chronotropic ability.  She feels well at this time and notices no change in her exercise tolerance.  We discussed whether to maintain this current controlled AT or attempt to restore sinus rhythm with cardioversion.  At this time she prefers to maintain the atrial tachycardia.  Her sinus rates had actually been a little slow, so is conceivable she may have a better functional status with

## 2024-11-27 ENCOUNTER — OFFICE VISIT (OUTPATIENT)
Dept: CARDIOLOGY CLINIC | Age: 84
End: 2024-11-27

## 2024-11-27 VITALS
WEIGHT: 123 LBS | HEART RATE: 87 BPM | HEIGHT: 64 IN | BODY MASS INDEX: 21 KG/M2 | DIASTOLIC BLOOD PRESSURE: 70 MMHG | SYSTOLIC BLOOD PRESSURE: 116 MMHG | OXYGEN SATURATION: 98 %

## 2024-11-27 DIAGNOSIS — I48.0 PAROXYSMAL ATRIAL FIBRILLATION (HCC): ICD-10-CM

## 2024-11-27 DIAGNOSIS — I44.0 FIRST DEGREE ATRIOVENTRICULAR BLOCK: ICD-10-CM

## 2024-11-27 DIAGNOSIS — R00.1 SINUS BRADYCARDIA: ICD-10-CM

## 2024-11-27 DIAGNOSIS — I47.19 ATRIAL TACHYCARDIA (HCC): Primary | ICD-10-CM

## 2024-11-27 RX ORDER — PROPAFENONE HYDROCHLORIDE 150 MG/1
TABLET, COATED ORAL
Qty: 270 TABLET | Refills: 0 | Status: SHIPPED | OUTPATIENT
Start: 2024-11-27

## 2024-11-27 RX ORDER — VERAPAMIL HYDROCHLORIDE 120 MG/1
120 TABLET ORAL 3 TIMES DAILY
Qty: 270 TABLET | Refills: 3 | Status: SHIPPED | OUTPATIENT
Start: 2024-11-27

## 2025-04-07 DIAGNOSIS — I48.0 PAROXYSMAL ATRIAL FIBRILLATION (HCC): ICD-10-CM

## 2025-04-07 DIAGNOSIS — I47.19 ATRIAL TACHYCARDIA: ICD-10-CM

## 2025-04-07 RX ORDER — PROPAFENONE HYDROCHLORIDE 150 MG/1
TABLET, COATED ORAL
Qty: 270 TABLET | Refills: 1 | Status: SHIPPED | OUTPATIENT
Start: 2025-04-07

## 2025-09-04 ENCOUNTER — APPOINTMENT (OUTPATIENT)
Dept: GENERAL RADIOLOGY | Age: 85
End: 2025-09-04
Payer: MEDICARE

## 2025-09-04 ENCOUNTER — HOSPITAL ENCOUNTER (EMERGENCY)
Age: 85
Discharge: HOME | End: 2025-09-05
Attending: EMERGENCY MEDICINE
Payer: MEDICARE

## 2025-09-04 VITALS
TEMPERATURE: 98.2 F | BODY MASS INDEX: 19.46 KG/M2 | HEIGHT: 67 IN | WEIGHT: 124 LBS | SYSTOLIC BLOOD PRESSURE: 150 MMHG | OXYGEN SATURATION: 98 % | DIASTOLIC BLOOD PRESSURE: 89 MMHG | HEART RATE: 95 BPM | RESPIRATION RATE: 16 BRPM
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
## 2025-09-04 DIAGNOSIS — S91.011A LACERATION OF RIGHT ANKLE, INITIAL ENCOUNTER: ICD-10-CM

## 2025-09-04 DIAGNOSIS — S80.01XA CONTUSION OF RIGHT KNEE, INITIAL ENCOUNTER: ICD-10-CM

## 2025-09-04 PROCEDURE — 6370000000 HC RX 637 (ALT 250 FOR IP)

## 2025-09-04 PROCEDURE — 73590 X-RAY EXAM OF LOWER LEG: CPT

## 2025-09-04 PROCEDURE — 73560 X-RAY EXAM OF KNEE 1 OR 2: CPT

## 2025-09-04 RX ORDER — HYDROCODONE BITARTRATE AND ACETAMINOPHEN 5; 325 MG/1; MG/1
2 TABLET ORAL ONCE
Status: COMPLETED | OUTPATIENT
Start: 2025-09-04 | End: 2025-09-04

## 2025-09-04 RX ADMIN — HYDROCODONE BITARTRATE AND ACETAMINOPHEN 2 TABLET: 5; 325 TABLET ORAL at 22:34

## 2025-09-04 ASSESSMENT — PAIN SCALES - GENERAL
PAINLEVEL_OUTOF10: 6
PAINLEVEL_OUTOF10: 5

## 2025-09-04 ASSESSMENT — PAIN - FUNCTIONAL ASSESSMENT
PAIN_FUNCTIONAL_ASSESSMENT: 0-10
PAIN_FUNCTIONAL_ASSESSMENT: 0-10

## 2025-09-04 ASSESSMENT — PAIN DESCRIPTION - LOCATION
LOCATION: LEG
LOCATION: LEG

## 2025-09-04 ASSESSMENT — PAIN DESCRIPTION - ORIENTATION
ORIENTATION: RIGHT
ORIENTATION: RIGHT